# Patient Record
Sex: MALE | Race: BLACK OR AFRICAN AMERICAN | NOT HISPANIC OR LATINO | URBAN - METROPOLITAN AREA
[De-identification: names, ages, dates, MRNs, and addresses within clinical notes are randomized per-mention and may not be internally consistent; named-entity substitution may affect disease eponyms.]

---

## 2018-06-12 ENCOUNTER — EMERGENCY (EMERGENCY)
Facility: HOSPITAL | Age: 53
LOS: 1 days | Discharge: HOME | End: 2018-06-12
Attending: PERSONAL EMERGENCY RESPONSE ATTENDANT
Payer: COMMERCIAL

## 2018-06-12 VITALS
SYSTOLIC BLOOD PRESSURE: 99 MMHG | TEMPERATURE: 98 F | OXYGEN SATURATION: 99 % | WEIGHT: 149.91 LBS | HEART RATE: 61 BPM | DIASTOLIC BLOOD PRESSURE: 67 MMHG | RESPIRATION RATE: 19 BRPM | HEIGHT: 67 IN

## 2018-06-12 LAB
ALBUMIN SERPL ELPH-MCNC: 4.3 G/DL — SIGNIFICANT CHANGE UP (ref 3.3–5)
ALP SERPL-CCNC: 55 U/L — SIGNIFICANT CHANGE UP (ref 40–120)
ALT FLD-CCNC: 9 U/L — LOW (ref 10–45)
ANION GAP SERPL CALC-SCNC: 13 MMOL/L — SIGNIFICANT CHANGE UP (ref 5–17)
AST SERPL-CCNC: 20 U/L — SIGNIFICANT CHANGE UP (ref 10–40)
BASOPHILS # BLD AUTO: 0 K/UL — SIGNIFICANT CHANGE UP (ref 0–0.2)
BASOPHILS NFR BLD AUTO: 1.1 % — SIGNIFICANT CHANGE UP (ref 0–2)
BILIRUB SERPL-MCNC: 0.4 MG/DL — SIGNIFICANT CHANGE UP (ref 0.2–1.2)
BUN SERPL-MCNC: 11 MG/DL — SIGNIFICANT CHANGE UP (ref 7–23)
CALCIUM SERPL-MCNC: 9.3 MG/DL — SIGNIFICANT CHANGE UP (ref 8.4–10.5)
CHLORIDE SERPL-SCNC: 95 MMOL/L — LOW (ref 96–108)
CK MB BLD-MCNC: 0.5 % — SIGNIFICANT CHANGE UP (ref 0–3.5)
CK MB CFR SERPL CALC: 1.1 NG/ML — SIGNIFICANT CHANGE UP (ref 0–6.7)
CK SERPL-CCNC: 244 U/L — HIGH (ref 30–200)
CO2 SERPL-SCNC: 27 MMOL/L — SIGNIFICANT CHANGE UP (ref 22–31)
CREAT SERPL-MCNC: 1.01 MG/DL — SIGNIFICANT CHANGE UP (ref 0.5–1.3)
EOSINOPHIL # BLD AUTO: 0.2 K/UL — SIGNIFICANT CHANGE UP (ref 0–0.5)
EOSINOPHIL NFR BLD AUTO: 4.3 % — SIGNIFICANT CHANGE UP (ref 0–6)
GLUCOSE SERPL-MCNC: 84 MG/DL — SIGNIFICANT CHANGE UP (ref 70–99)
HCT VFR BLD CALC: 30.1 % — LOW (ref 39–50)
HGB BLD-MCNC: 10.6 G/DL — LOW (ref 13–17)
LIDOCAIN IGE QN: 21 U/L — SIGNIFICANT CHANGE UP (ref 7–60)
LYMPHOCYTES # BLD AUTO: 0.9 K/UL — LOW (ref 1–3.3)
LYMPHOCYTES # BLD AUTO: 23.3 % — SIGNIFICANT CHANGE UP (ref 13–44)
MCHC RBC-ENTMCNC: 29.3 PG — SIGNIFICANT CHANGE UP (ref 27–34)
MCHC RBC-ENTMCNC: 35.2 GM/DL — SIGNIFICANT CHANGE UP (ref 32–36)
MCV RBC AUTO: 83.1 FL — SIGNIFICANT CHANGE UP (ref 80–100)
MONOCYTES # BLD AUTO: 0.5 K/UL — SIGNIFICANT CHANGE UP (ref 0–0.9)
MONOCYTES NFR BLD AUTO: 12.8 % — SIGNIFICANT CHANGE UP (ref 2–14)
NEUTROPHILS # BLD AUTO: 2.4 K/UL — SIGNIFICANT CHANGE UP (ref 1.8–7.4)
NEUTROPHILS NFR BLD AUTO: 58.5 % — SIGNIFICANT CHANGE UP (ref 43–77)
PLATELET # BLD AUTO: 292 K/UL — SIGNIFICANT CHANGE UP (ref 150–400)
POTASSIUM SERPL-MCNC: 4.2 MMOL/L — SIGNIFICANT CHANGE UP (ref 3.5–5.3)
POTASSIUM SERPL-SCNC: 4.2 MMOL/L — SIGNIFICANT CHANGE UP (ref 3.5–5.3)
PROT SERPL-MCNC: 8.4 G/DL — HIGH (ref 6–8.3)
RBC # BLD: 3.62 M/UL — LOW (ref 4.2–5.8)
RBC # FLD: 11.7 % — SIGNIFICANT CHANGE UP (ref 10.3–14.5)
SODIUM SERPL-SCNC: 135 MMOL/L — SIGNIFICANT CHANGE UP (ref 135–145)
TROPONIN T SERPL-MCNC: <0.01 NG/ML — SIGNIFICANT CHANGE UP (ref 0–0.06)
TROPONIN T SERPL-MCNC: <0.01 NG/ML — SIGNIFICANT CHANGE UP (ref 0–0.06)
WBC # BLD: 4 K/UL — SIGNIFICANT CHANGE UP (ref 3.8–10.5)
WBC # FLD AUTO: 4 K/UL — SIGNIFICANT CHANGE UP (ref 3.8–10.5)

## 2018-06-12 PROCEDURE — 71045 X-RAY EXAM CHEST 1 VIEW: CPT | Mod: 26

## 2018-06-12 PROCEDURE — 74177 CT ABD & PELVIS W/CONTRAST: CPT | Mod: 26

## 2018-06-12 PROCEDURE — 99220: CPT

## 2018-06-12 PROCEDURE — 71275 CT ANGIOGRAPHY CHEST: CPT | Mod: 26

## 2018-06-12 PROCEDURE — 93010 ELECTROCARDIOGRAM REPORT: CPT

## 2018-06-12 RX ORDER — ASPIRIN/CALCIUM CARB/MAGNESIUM 324 MG
81 TABLET ORAL DAILY
Qty: 0 | Refills: 0 | Status: DISCONTINUED | OUTPATIENT
Start: 2018-06-12 | End: 2018-06-16

## 2018-06-12 RX ORDER — ASPIRIN/CALCIUM CARB/MAGNESIUM 324 MG
324 TABLET ORAL ONCE
Qty: 0 | Refills: 0 | Status: COMPLETED | OUTPATIENT
Start: 2018-06-12 | End: 2018-06-12

## 2018-06-12 NOTE — ED CDU PROVIDER INITIAL DAY NOTE - DETAILS
Pt with worsening SOB on exertion, trop negative, CTA chest negative for PE, observe, repeat troponins, tele, stress test. Pt requires frequent reevaluation, d/w Dr. Schreiber.

## 2018-06-12 NOTE — ED CDU PROVIDER INITIAL DAY NOTE - PHYSICAL EXAMINATION
Pt in NAD, A&O x3. CN 2-12 grossly intact. Head NCAT, sclera white without injection, PERRLA, EOMI. Nares patent, turbinates without edema or erythema, no polyps or septal deviation. No auricular tenderness. Mouth and pharynx without erythema or exudates, uvula midline, no tonsillar enlargement. Trachea midline, no lymphadenopathy, no obvious JVD. No retractions or chest wall deformities. No chest wall tenderness, CTA anterior and posterior b/l, no wheezes, rales, or rhonchi. RRR, clear distinct S1, S2, no S3, S4, murmurs, gallops, or rubs. Abdomen without obvious deformities, lesions, or pulsations. NABS all 4 quadrants, abdomen soft, non-tender, no organomegaly or masses. No CVAT b/l. 2+ carotid, radial, ulnar, dorsalis pedis, and posterior tibial pulses. Cap refill <2 sec, no cyanosis or clubbing. No edema or tenderness of the lower extremities.

## 2018-06-12 NOTE — ED PROVIDER NOTE - PROGRESS NOTE DETAILS
Amilcar PALOMINO - 52yoM hx of sarcoid pw worsening, exertional shortness of breath no chest pain. no fevers, chills, nausea, vomiting, diarreha. + wieght loss. concern for PE vs acs vs malignancy. will send labs, trop, ekg, cxr, ct angio and admit vs cdu for stress. Attending MD [employer]:  Blood cultures added to labs 2/2 friend report of pt's recent dental procedure preceding his precipitous decline.  Concern for poss endocarditis.  No murmurs auscultated. Amilcar PALOMINO - will send to CDU for stress.

## 2018-06-12 NOTE — ED PROVIDER NOTE - OBJECTIVE STATEMENT
Attending MD Schreiber.  Pt is a 52 yr old male with isolated PMHx of sarcoidosis.  Had echo ~1 yr ago and endroses stress test poss 3 yrs ago.  No overt cardiac hx.  Pt has been having increased SOB with ambulation ra up stairs over the last 2 mos which is his reason for presentation now.  No known hx of PE/blood clots.  No AC on board.  No LE edema/pain.  Pt's partner () is with him and has privately endorsed a sig concern because of pt's sig change in body habitus, eating habits and recently his mental status.  States that he's begun not eating much and becomes so SOB after walking up a flight of stairs that he doubles over SOB and vomited once this morning.  No LE edema.  Pt does not believe he has a cardiologist, cannot remember name of PCP.  Of note pt has a soft BP but states that this is chronic for him.  He is not hypoxic. No acute resp distress.    Pulm: Dr. Lares Attending MD Schreiber.  Pt is a 52 yr old male with isolated PMHx of sarcoidosis.  Had echo ~1 yr ago and endroses stress test poss 3 yrs ago.  No overt cardiac hx.  Pt has been having increased SOB with ambulation ra up stairs over the last 2 mos which is his reason for presentation now.  No known hx of PE/blood clots.  No AC on board.  No LE edema/pain.  Pt's partner () is with him and has privately endorsed a sig concern because of pt's sig change in body habitus, eating habits and recently his mental status.  States that he's begun not eating much and becomes so SOB after walking up a flight of stairs that he doubles over SOB and vomited once this morning.  No LE edema.  Pt does not believe he has a cardiologist, cannot remember name of PCP.  Of note pt has a soft BP but states that this is chronic for him.  He is not hypoxic. No acute resp distress.   Lifetime non-smoker.  Pulm: Dr. Lares

## 2018-06-12 NOTE — ED CDU PROVIDER INITIAL DAY NOTE - OBJECTIVE STATEMENT
53 y/o male with h/o u/l headache/facial pain managed with Allegra seen by neuro, and sarcoidosis (Pulm Dr. Lares) presents with gradually worsening SOB x 3 months, pt states he was able to take the stairs at work yesterday but was winded today, +episodic dizziness x 3months with no clear palliative/provoking factors, denies chest pain, back pain, and cough. Pt has felt well recently other than some of his usual seasonal allergy symptoms. Pt does not feel SOB now or at rest. Prior stress test "a few years ago" for unknown reason, not currently seen by cardiology. Pt denies fevers, chills, numbness, paresthesias, weakness, falls/trauma, syncope, new HA, abdominal pain, n/v/d, recent leg pain or swelling, h/o DVT, PE, stroke, TIA, MI, sick contacts, recent illness, or prior episodes. 51 y/o male with h/o u/l headache/facial pain managed with Allegra seen by neuro, and sarcoidosis (Pulm Dr. Lares) presents with gradually worsening SOB x 3 months, pt states he was able to take the stairs with minimal difficulty yesterday but was winded doing so today, +episodic dizziness x 3months with no clear palliative/provoking factors, denies chest pain, back pain, and cough. Pt has felt well recently other than some of his usual seasonal allergy symptoms. Pt does not feel SOB now or at rest. Prior stress test "a few years ago" for unknown reason, not currently seen by cardiology. Pt denies fevers, chills, numbness, paresthesias, weakness, falls/trauma, syncope, new HA, abdominal pain, n/v/d, recent leg pain or swelling, h/o DVT, PE, stroke, TIA, MI, sick contacts, recent illness, or prior episodes. 53 y/o male with h/o u/l headache/facial pain managed with Allegra seen by neuro, and sarcoidosis (Pulm Dr. Lares) presents with gradually worsening SOB x 3 months, pt states he was able to take the stairs with minimal difficulty yesterday but was winded doing so today, +episodic dizziness x 3months with no clear palliative/provoking factors, denies chest pain, back pain, and cough. Pt has felt well recently other than some of his usual seasonal allergy symptoms. Pt does not feel SOB now or at rest. Prior stress test "a few years ago" for unknown reason, not currently seen by cardiology. Pt denies fevers, chills, numbness, paresthesias, weakness, falls/trauma, syncope, new HA, abdominal pain, n/v/d, recent leg pain or swelling, h/o DVT, PE, stroke, TIA, MI, sick contacts, recent illness, or prior episodes.    ED course: VS wnl, pt in NAD non-toxic, no hypoxia, CTA negative for PE, troponin x1 negative.

## 2018-06-12 NOTE — ED ADULT NURSE NOTE - OBJECTIVE STATEMENT
53y/o Male presented to the ED from home with complaint of SOB. A&Ox3, ambulatory. Patient reports SOB with exertion x1 month. Reports dizziness x4 months. Reports symptoms are worse when "walking up and down the stairs. Denies LOC/syncope. Reports abdominal cramping/ constipation for a few months. Reports x1 episode of vomiting with diarrhea this morning. Reports decreased apatite since 2015. Patients friends reports "that patient has been himself the past year with significant weight loss."  Denies visual changes, double vision, headache, chest pain. No respiratory distress noted, SPO2 100% on room air, RR 18. No use of accessory muscles noted, skin color appropriate for race. Lung sounds equal/clear bilaterally, cap refill < 2 sec. Patient states he had his "wisdom tooth removed in 2014 and has residual right sided facial numbness." 53y/o Male presented to the ED from home with complaint of SOB. A&Ox3, ambulatory. Patient reports SOB with exertion x1 month. Reports dizziness x4 months. Reports symptoms are worse when "walking up and down the stairs. Denies SOB at this time. Denies LOC/syncope. Reports abdominal cramping/ constipation for a few months. Reports x1 episode of vomiting with diarrhea this morning. Reports decreased apatite since 2015. Patients friends reports "that patient has been himself the past year with significant weight loss."  Denies visual changes, double vision, headache, chest pain. No respiratory distress noted, SPO2 100% on room air, RR 18. No use of accessory muscles noted, skin color appropriate for race. Lung sounds equal/clear bilaterally, cap refill < 2 sec. Patient states he had his "wisdom tooth removed in 2014 and has residual right sided facial numbness."

## 2018-06-12 NOTE — ED PROVIDER NOTE - MEDICAL DECISION MAKING DETAILS
52yoM hx of sarcoid pw sob, worsening. concern for pe vs acs vs other. will ct angio, trop, ekg, cxr and admit vs cdu for stress.

## 2018-06-12 NOTE — ED CDU PROVIDER INITIAL DAY NOTE - MEDICAL DECISION MAKING DETAILS
Attending MD Schreiber.  Pt is a 52 yr old male with isolated PMHx of sarcoidosis.  Had echo ~1 yr ago and endroses stress test poss 3 yrs ago.  No overt cardiac hx.  Pt has been having increased SOB with ambulation ra up stairs over the last 2 mos which is his reason for presentation now.  No known hx of PE/blood clots.  No AC on board.  No LE edema/pain.  Pt's partner () is with him and has privately endorsed a sig concern because of pt's sig change in body habitus, eating habits and recently his mental status.  States that he's begun not eating much and becomes so SOB after walking up a flight of stairs that he doubles over SOB and vomited once this morning.  No LE edema.  Pt does not believe he has a cardiologist, cannot remember name of PCP.  Of note pt has a soft BP but states that this is chronic for him.  He is not hypoxic. No acute resp distress.   Lifetime non-smoker.  CTA non-actionable for PE.  Pt warrants transfer of care to CDU observation unit for stress/echo.

## 2018-06-12 NOTE — ED CDU PROVIDER INITIAL DAY NOTE - FAMILY HISTORY
Mother  Still living? Unknown  Family history of hypertension, Age at diagnosis: Age Unknown  Family history of diabetes mellitus in mother, Age at diagnosis: Age Unknown  Family history of cardiac pacemaker, Age at diagnosis: Age Unknown

## 2018-06-12 NOTE — ED CDU PROVIDER DISPOSITION NOTE - ATTENDING CONTRIBUTION TO CARE
SOB/MCKEON in pt with sarcoidosis.  r/o anginal equivalent and CAD --> CTCA as requested by cardiology.  cardiac monitor, reassess.

## 2018-06-12 NOTE — ED ADULT TRIAGE NOTE - CHIEF COMPLAINT QUOTE
SOB, dizziness for 3 months, constipation, anemia, Pt states that he was seen out patient for dizziness with negative CT scan

## 2018-06-12 NOTE — ED CDU PROVIDER DISPOSITION NOTE - PLAN OF CARE
1. Follow up with your PMD in the next 1-2 days. Additionally please follow up with out cardiology clinic ([phone]) within the next 2-3 days for further evaluation/management. Please follow up with your pulmonologist regarding the lung nodule found on your CT scan.  2. Show copies of your reports given to you. Recommend Aspirin 81mg over the counter daily until further evaluation.  Take all of your other medications as previously prescribed.   3. If you develop any worsening or continued chest pain, shortness of breath, weakness, nausea/vomiting, or for all other concerning symptoms return to ED immediately.

## 2018-06-12 NOTE — ED CDU PROVIDER INITIAL DAY NOTE - PROGRESS NOTE DETAILS
Patient resting in bed comfortably in NAD. No complaints. VSS. No events on telemetry monitor. Patient seen at bedside in NAD.  VSS.  Patient resting comfortably without complaints. No events on tele. Patient without current complaints including cp, sob, weakness, n/v, dizziness, or other concerns at this time. Will continue to monitor. - Ranjit Saha PA-C

## 2018-06-12 NOTE — ED CDU PROVIDER DISPOSITION NOTE - CLINICAL COURSE
53 y/o male with h/o u/l headache/facial pain managed with Allegra seen by neuro, and sarcoidosis (Pulm Dr. Lares) presents with gradually worsening SOB x 3 months, pt states he was able to take the stairs with minimal difficulty yesterday but was winded doing so today, +episodic dizziness x 3months with no clear palliative/provoking factors, denies chest pain, back pain, and cough. Pt has felt well recently other than some of his usual seasonal allergy symptoms. Pt does not feel SOB now or at rest. Prior stress test "a few years ago" for unknown reason, not currently seen by cardiology. Pt denies fevers, chills, numbness, paresthesias, weakness, falls/trauma, syncope, new HA, abdominal pain, n/v/d, recent leg pain or swelling, h/o DVT, PE, stroke, TIA, MI, sick contacts, recent illness, or prior episodes.    ED course: VS wnl, pt in NAD non-toxic, no hypoxia, CXR wnl, CTA negative for PE, CK: 244, troponin x1 negative.  CDU: Serial reeval, 2nd troponin __, tele, stress in the am. Pt remains stable, in NAD, VS wnl, no acute or concerning physical exam findings. 51 y/o male with h/o u/l headache/facial pain managed with Allegra seen by neuro, and sarcoidosis (Pulm Dr. Lares) presents with gradually worsening SOB x 3 months, pt states he was able to take the stairs with minimal difficulty yesterday but was winded doing so today, +episodic dizziness x 3months with no clear palliative/provoking factors, denies chest pain, back pain, and cough. Pt has felt well recently other than some of his usual seasonal allergy symptoms. Pt does not feel SOB now or at rest. Prior stress test "a few years ago" for unknown reason, not currently seen by cardiology. Pt denies fevers, chills, numbness, paresthesias, weakness, falls/trauma, syncope, new HA, abdominal pain, n/v/d, recent leg pain or swelling, h/o DVT, PE, stroke, TIA, MI, sick contacts, recent illness, or prior episodes.    ED course: VS wnl, pt in NAD non-toxic, no hypoxia, CXR wnl, CTA negative for PE, CK: 244, troponin x1 negative.  CDU: Serial reeval, 2nd troponin __, tele, stress in the am. Pt remains stable, in NAD, VS wnl, no acute or concerning physical exam findings.  PM: _______________ 51 y/o male with h/o u/l headache/facial pain managed with Allegra seen by neuro, and sarcoidosis (Pulm Dr. Lares) presents with gradually worsening SOB x 3 months, pt states he was able to take the stairs with minimal difficulty yesterday but was winded doing so today, +episodic dizziness x 3months with no clear palliative/provoking factors, denies chest pain, back pain, and cough. Pt has felt well recently other than some of his usual seasonal allergy symptoms. Pt does not feel SOB now or at rest. Prior stress test "a few years ago" for unknown reason, not currently seen by cardiology. Pt denies fevers, chills, numbness, paresthesias, weakness, falls/trauma, syncope, new HA, abdominal pain, n/v/d, recent leg pain or swelling, h/o DVT, PE, stroke, TIA, MI, sick contacts, recent illness, or prior episodes.    ED course: VS wnl, pt in NAD non-toxic, no hypoxia, CXR wnl, CTA negative for PE, CK: 244, troponin x1 negative.  CDU: Serial reeval, 2nd troponin negative. No events on tele stress in the am. Pt remained stable, in NAD, VS wnl, no acute or concerning physical exam findings. Pt had no adverse events overnight while in CDU with no concerning symptoms. Stress in AM resulted as _____________________. 51 y/o male with h/o u/l headache/facial pain managed with Allegra seen by neuro, and sarcoidosis (Pulm Dr. Lares) presents with gradually worsening SOB x 3 months, pt states he was able to take the stairs with minimal difficulty yesterday but was winded doing so today, +episodic dizziness x 3months with no clear palliative/provoking factors, denies chest pain, back pain, and cough. Pt has felt well recently other than some of his usual seasonal allergy symptoms. Pt does not feel SOB now or at rest. Prior stress test "a few years ago" for unknown reason, not currently seen by cardiology. Pt denies fevers, chills, numbness, paresthesias, weakness, falls/trauma, syncope, new HA, abdominal pain, n/v/d, recent leg pain or swelling, h/o DVT, PE, stroke, TIA, MI, sick contacts, recent illness, or prior episodes.    ED course: VS wnl, pt in NAD non-toxic, no hypoxia, CXR wnl, CTA negative for PE, CK: 244, troponin x1 negative.  CDU: Serial reeval, 2nd troponin negative. No events on tele stress in the am. Pt remained stable, in NAD, VS wnl, no acute or concerning physical exam findings. Pt had no adverse events overnight while in CDU with no concerning symptoms. CTC performed in AM which was negative. Patient safe to be discharged home per ED attending Dr. Bernardo

## 2018-06-13 VITALS
RESPIRATION RATE: 18 BRPM | DIASTOLIC BLOOD PRESSURE: 69 MMHG | HEART RATE: 62 BPM | TEMPERATURE: 99 F | SYSTOLIC BLOOD PRESSURE: 103 MMHG | OXYGEN SATURATION: 100 %

## 2018-06-13 LAB
CHOLEST SERPL-MCNC: 217 MG/DL — HIGH (ref 10–199)
HBA1C BLD-MCNC: 5.2 % — SIGNIFICANT CHANGE UP (ref 4–5.6)
HDLC SERPL-MCNC: 40 MG/DL — SIGNIFICANT CHANGE UP (ref 40–125)
LIPID PNL WITH DIRECT LDL SERPL: 161 MG/DL — HIGH
TOTAL CHOLESTEROL/HDL RATIO MEASUREMENT: 5.4 RATIO — SIGNIFICANT CHANGE UP (ref 3.4–9.6)
TRIGL SERPL-MCNC: 81 MG/DL — SIGNIFICANT CHANGE UP (ref 10–149)

## 2018-06-13 PROCEDURE — 84484 ASSAY OF TROPONIN QUANT: CPT

## 2018-06-13 PROCEDURE — 75571 CT HRT W/O DYE W/CA TEST: CPT

## 2018-06-13 PROCEDURE — 93005 ELECTROCARDIOGRAM TRACING: CPT | Mod: 76

## 2018-06-13 PROCEDURE — 83036 HEMOGLOBIN GLYCOSYLATED A1C: CPT

## 2018-06-13 PROCEDURE — 83690 ASSAY OF LIPASE: CPT

## 2018-06-13 PROCEDURE — 82553 CREATINE MB FRACTION: CPT

## 2018-06-13 PROCEDURE — 75574 CT ANGIO HRT W/3D IMAGE: CPT

## 2018-06-13 PROCEDURE — 96376 TX/PRO/DX INJ SAME DRUG ADON: CPT

## 2018-06-13 PROCEDURE — 96374 THER/PROPH/DIAG INJ IV PUSH: CPT

## 2018-06-13 PROCEDURE — 80053 COMPREHEN METABOLIC PANEL: CPT

## 2018-06-13 PROCEDURE — 80061 LIPID PANEL: CPT

## 2018-06-13 PROCEDURE — 71045 X-RAY EXAM CHEST 1 VIEW: CPT

## 2018-06-13 PROCEDURE — 75574 CT ANGIO HRT W/3D IMAGE: CPT | Mod: 26

## 2018-06-13 PROCEDURE — 87040 BLOOD CULTURE FOR BACTERIA: CPT

## 2018-06-13 PROCEDURE — 74177 CT ABD & PELVIS W/CONTRAST: CPT

## 2018-06-13 PROCEDURE — 99217: CPT

## 2018-06-13 PROCEDURE — 85027 COMPLETE CBC AUTOMATED: CPT

## 2018-06-13 PROCEDURE — 82550 ASSAY OF CK (CPK): CPT

## 2018-06-13 PROCEDURE — 99284 EMERGENCY DEPT VISIT MOD MDM: CPT | Mod: 25

## 2018-06-13 PROCEDURE — G0378: CPT

## 2018-06-13 PROCEDURE — 71275 CT ANGIOGRAPHY CHEST: CPT

## 2018-06-13 RX ORDER — SODIUM CHLORIDE 9 MG/ML
1000 INJECTION INTRAMUSCULAR; INTRAVENOUS; SUBCUTANEOUS ONCE
Qty: 0 | Refills: 0 | Status: COMPLETED | OUTPATIENT
Start: 2018-06-13 | End: 2018-06-13

## 2018-06-13 RX ORDER — ONDANSETRON 8 MG/1
4 TABLET, FILM COATED ORAL ONCE
Qty: 0 | Refills: 0 | Status: COMPLETED | OUTPATIENT
Start: 2018-06-13 | End: 2018-06-13

## 2018-06-13 RX ORDER — ALPRAZOLAM 0.25 MG
0.25 TABLET ORAL ONCE
Qty: 0 | Refills: 0 | Status: DISCONTINUED | OUTPATIENT
Start: 2018-06-13 | End: 2018-06-13

## 2018-06-13 RX ORDER — METOPROLOL TARTRATE 50 MG
100 TABLET ORAL ONCE
Qty: 0 | Refills: 0 | Status: COMPLETED | OUTPATIENT
Start: 2018-06-13 | End: 2018-06-13

## 2018-06-13 RX ORDER — SODIUM CHLORIDE 9 MG/ML
1000 INJECTION INTRAMUSCULAR; INTRAVENOUS; SUBCUTANEOUS
Qty: 0 | Refills: 0 | Status: DISCONTINUED | OUTPATIENT
Start: 2018-06-13 | End: 2018-06-16

## 2018-06-13 RX ADMIN — SODIUM CHLORIDE 150 MILLILITER(S): 9 INJECTION INTRAMUSCULAR; INTRAVENOUS; SUBCUTANEOUS at 09:32

## 2018-06-13 RX ADMIN — Medication 0.25 MILLIGRAM(S): at 08:36

## 2018-06-13 RX ADMIN — ONDANSETRON 4 MILLIGRAM(S): 8 TABLET, FILM COATED ORAL at 08:36

## 2018-06-13 RX ADMIN — ONDANSETRON 4 MILLIGRAM(S): 8 TABLET, FILM COATED ORAL at 12:57

## 2018-06-13 RX ADMIN — SODIUM CHLORIDE 1000 MILLILITER(S): 9 INJECTION INTRAMUSCULAR; INTRAVENOUS; SUBCUTANEOUS at 08:40

## 2018-06-13 RX ADMIN — Medication 100 MILLIGRAM(S): at 08:35

## 2018-06-13 RX ADMIN — SODIUM CHLORIDE 500 MILLILITER(S): 9 INJECTION INTRAMUSCULAR; INTRAVENOUS; SUBCUTANEOUS at 08:36

## 2018-06-13 NOTE — ED ADULT NURSE REASSESSMENT NOTE - NS ED NURSE REASSESS COMMENT FT1
1600   Pt was evaluated by  DR Tova OBANDO MD with all the results  . Pt is discharged .  Pt  feeling better Vital signs stable.  SHELDON Hernandez Explained the follow up  & discharge summary  Pt verbalized the understanding on the same Pt stable to go home.
Patient A&Ox3. Patient resting comfortably in bed, no complaints at this time. Patient awaiting CT abdomen. Friend at bedside. Patient on cardiac monitor, NSR. Will continue to monitor.
Pt report received from Neeta AWRD. Pt transferred to cdu BED 2 for cardiac monitoring and NucStress. Pt a/ox3 denies respiratory distress, sob, dyspnea, C/P, palpitations, n/v/d at this time.  VSS, afebrile, IV clean/dry/intact. Pt aware of plan of care, call bell within reach ,safety maintained. Will monitor and assess.
Pt received from SYLVIA Olsen. Pt oriented to CDU & plan of care was discussed. Pt denies any chest pain, SOB, dizziness or palpitations at this time. Pt endorses severe SOB when ambulating up the stairs associated with lightheadedness and weakness. Pt aware to notify RN or PA of any dizziness prior to ambulation. Safety & comfort measures maintained. Call bell in reach. Will continue to monitor.
07.00 Am  Received Report from Kashmir Lamar   08.30 AM Pt reassessed . Pt resting. Pt states he feels better ,  Kathryn SOB now  ,Vital signs has soft blood pressure 90/60 Medicated as per order  , IV med lock looks clean & dry No signs of infiltration noted.  Comfort care & safety measures initiated.  Kathryn fever chills CP SOB afebrile here. Continue to Monitor .Pt is awaiting for  CTC

## 2018-06-13 NOTE — ED CDU PROVIDER SUBSEQUENT DAY NOTE - PROGRESS NOTE DETAILS
Patient sleeping. NAD. No complaints. VSS. No events on tele. Will continue to monitor. - Ranjit Saha PA-C Patient seen at bedside in NAD.  VSS.  Patient resting comfortably without complaints. No events on tele. No current shortness of breath at rest, chest pain, dizziness, weakness, n/v, cough, fever/chills. Patient appears well. Will continue to monitor. - Ranjit Saha PA-C SHELDON Hernandez: Patient blood pressure 90/60s however patient asymptomatic and pt stated his BP runs low. SYLVIA Jacobs called on behalf of Dr. Diego De La Torre requesting a CTC performed on this patient given low risk factors. Arlene WARD stated patient who get CTC usually received 60mL of contrast. Patient received 90ml contrast yesterday for CT abdomen/pelvis/chest and has good renal function. Discussed case with Red attending Dr. Argueta who stated ok to do CTC and to discuss risks with patient as patient just received contrast and hydrate patient while in CDU. Discussed risk factors with patient and made aware will hydrate pt while in CDU to prevent MICHAEL. Patient understood and ok to do CTC. Patient stated he feels nauseous with contrast and gets "warm sensation" with contrast therefore will give xanax 0.25mg to prevent elevation in HR while receiving contrast, will bolus patient with NS and place on maintenance fluids, zofran prior to CTC, and lopressor 100mg as patient HR in 70s. No events noted while on tele. Made CTC tech aware of new order. Patient understood plan and remain asymptomatic with no SOB, CP, abdominal pain, N/V, or dizziness Pt without complaint.  CT of chest/abdo/pelvis neg for dissection and c/w sarcoidosis.  Trop eng  Pt pending CTCornoaryAngio to eval for CAD.  Pt stable for D/c if neg. SHELDON Hernandez: CTC called stating patient ate prior to CTC and needs to NPO 4 hours prior to CTC. Made nuc stress lab aware will have to do pharm nuc as patient was beta blocked with lopressor this morning in anticipation for CTC. Made ED attending Dr. Bernardo aware of plan. Patient stated he did not have caffeine this morning and only had oatmeal with milk SHELDON Hernandez: CTC Dr. Armstrong called to discuss pt and stated will do CTC this afternoon. Will instruct patient to not eat and will give zofran prior to CTC. HR in mid 50s SHELDON Hernandez: CTC negative therefore per ED attending Dr. Bernardo patient safe to be discharged home

## 2018-06-13 NOTE — ED CDU PROVIDER SUBSEQUENT DAY NOTE - HISTORY
Patient received phone call so was woken up and evaluated at that time. He was seen at bedside in NAD with stable vital signs and without complaints. Patient appeared well. Denied current cp, sob, weakness, n/v, numbness/tingling. He's been sleeping most of the night without complaints or events or telemetry. Will continue to monitor. - Ranjit Saha PA-C Patient woke up so evaluated at this time. He was seen at bedside in NAD with stable vital signs and without complaints. Patient appeared well. Denied current cp, sob, weakness, n/v, numbness/tingling. He's been sleeping most of the night without complaints or events or telemetry. Will continue to monitor. - Ranjit Saha PA-C

## 2018-06-13 NOTE — ED ADULT NURSE REASSESSMENT NOTE - COMFORT CARE
plan of care explained/po fluids offered/meal provided
plan of care explained/ambulated to bathroom/po fluids offered

## 2018-06-17 LAB
CULTURE RESULTS: SIGNIFICANT CHANGE UP
CULTURE RESULTS: SIGNIFICANT CHANGE UP
SPECIMEN SOURCE: SIGNIFICANT CHANGE UP
SPECIMEN SOURCE: SIGNIFICANT CHANGE UP

## 2018-06-25 PROBLEM — Z00.00 ENCOUNTER FOR PREVENTIVE HEALTH EXAMINATION: Status: ACTIVE | Noted: 2018-06-25

## 2018-07-18 ENCOUNTER — APPOINTMENT (OUTPATIENT)
Dept: ENDOCRINOLOGY | Facility: CLINIC | Age: 53
End: 2018-07-18
Payer: COMMERCIAL

## 2018-07-18 VITALS
HEART RATE: 91 BPM | HEIGHT: 68 IN | SYSTOLIC BLOOD PRESSURE: 84 MMHG | BODY MASS INDEX: 21.67 KG/M2 | WEIGHT: 143 LBS | DIASTOLIC BLOOD PRESSURE: 64 MMHG

## 2018-07-18 DIAGNOSIS — Z83.3 FAMILY HISTORY OF DIABETES MELLITUS: ICD-10-CM

## 2018-07-18 PROCEDURE — 99204 OFFICE O/P NEW MOD 45 MIN: CPT

## 2018-07-19 LAB
CORTIS PRE/P CHAL SERPL-SCNC: NORMAL
CORTIS SERPL-MCNC: 3.7 UG/DL
CORTIS SP2 SERPL-MCNC: 12.2
PROLACTIN SERPL-MCNC: 10.8 NG/ML
T4 FREE SERPL-MCNC: 0.6 NG/DL
TSH SERPL-ACNC: 1.38 UIU/ML

## 2018-07-24 LAB — ACTH-ESO: 14 PG/ML

## 2018-08-03 PROBLEM — D86.9 SARCOIDOSIS, UNSPECIFIED: Chronic | Status: ACTIVE | Noted: 2018-06-12

## 2018-08-10 ENCOUNTER — FORM ENCOUNTER (OUTPATIENT)
Age: 53
End: 2018-08-10

## 2018-08-11 ENCOUNTER — APPOINTMENT (OUTPATIENT)
Dept: MRI IMAGING | Facility: HOSPITAL | Age: 53
End: 2018-08-11
Payer: COMMERCIAL

## 2018-08-11 ENCOUNTER — OUTPATIENT (OUTPATIENT)
Dept: OUTPATIENT SERVICES | Facility: HOSPITAL | Age: 53
LOS: 1 days | End: 2018-08-11
Payer: COMMERCIAL

## 2018-08-11 PROCEDURE — 70553 MRI BRAIN STEM W/O & W/DYE: CPT | Mod: 26

## 2018-08-11 PROCEDURE — 70553 MRI BRAIN STEM W/O & W/DYE: CPT

## 2018-08-11 PROCEDURE — A9585: CPT

## 2018-08-16 ENCOUNTER — APPOINTMENT (OUTPATIENT)
Dept: ENDOCRINOLOGY | Facility: CLINIC | Age: 53
End: 2018-08-16
Payer: COMMERCIAL

## 2018-08-16 VITALS
BODY MASS INDEX: 23.34 KG/M2 | DIASTOLIC BLOOD PRESSURE: 71 MMHG | HEART RATE: 75 BPM | WEIGHT: 154 LBS | HEIGHT: 68 IN | SYSTOLIC BLOOD PRESSURE: 112 MMHG

## 2018-08-16 DIAGNOSIS — N52.9 MALE ERECTILE DYSFUNCTION, UNSPECIFIED: ICD-10-CM

## 2018-08-16 PROCEDURE — 99214 OFFICE O/P EST MOD 30 MIN: CPT

## 2018-08-22 ENCOUNTER — APPOINTMENT (OUTPATIENT)
Dept: NEUROLOGY | Facility: CLINIC | Age: 53
End: 2018-08-22
Payer: COMMERCIAL

## 2018-08-22 VITALS
DIASTOLIC BLOOD PRESSURE: 73 MMHG | HEART RATE: 75 BPM | BODY MASS INDEX: 23.64 KG/M2 | HEIGHT: 68 IN | WEIGHT: 156 LBS | SYSTOLIC BLOOD PRESSURE: 103 MMHG

## 2018-08-22 PROCEDURE — 99205 OFFICE O/P NEW HI 60 MIN: CPT

## 2018-08-27 ENCOUNTER — CLINICAL ADVICE (OUTPATIENT)
Age: 53
End: 2018-08-27

## 2018-09-10 ENCOUNTER — TRANSCRIPTION ENCOUNTER (OUTPATIENT)
Age: 53
End: 2018-09-10

## 2018-09-12 ENCOUNTER — CHART COPY (OUTPATIENT)
Age: 53
End: 2018-09-12

## 2018-09-26 ENCOUNTER — LABORATORY RESULT (OUTPATIENT)
Age: 53
End: 2018-09-26

## 2018-09-26 ENCOUNTER — APPOINTMENT (OUTPATIENT)
Dept: NEUROLOGY | Facility: CLINIC | Age: 53
End: 2018-09-26
Payer: COMMERCIAL

## 2018-09-26 PROCEDURE — 62270 DX LMBR SPI PNXR: CPT

## 2018-09-27 LAB
ALBUMIN CSF ELPH-MCNC: 68.7 MG/DL
ALBUMIN MFR SERPL ELPH: 54.8 %
ALBUMIN SERPL-MCNC: 3958 MG/DL
ALBUMIN SERPL-MCNC: 4 G/DL
ALBUMIN/GLOB CSF: 0.44 RATIO
ALBUMIN/GLOB SERPL: 1.2 RATIO
ALPHA1 GLOB MFR SERPL ELPH: 3.4 %
ALPHA1 GLOB SERPL ELPH-MCNC: 0.2 G/DL
ALPHA2 GLOB MFR SERPL ELPH: 9.9 %
ALPHA2 GLOB SERPL ELPH-MCNC: 0.7 G/DL
APPEARANCE CSF: CLEAR
B-GLOBULIN MFR SERPL ELPH: 10.4 %
B-GLOBULIN SERPL ELPH-MCNC: 0.8 G/DL
COLOR CSF: NORMAL
COLOR SPUN CSF: COLORLESS
CRYPTOC AG CSF QL: NEGATIVE
CSFPCR RESULT: NOT DETECTED
GAMMA GLOB CSF ELPH-MCNC: 30.5 MG/DL
GAMMA GLOB FLD ELPH-MCNC: 1.6 G/DL
GAMMA GLOB MFR CSF ELPH: 78 MG/DAY
GAMMA GLOB MFR SERPL ELPH: 21.5 %
IGG SER QL IEP: 1795 MG/DL
IGG/ALB CLEAR SER+CSF-RTO: 1
IGG/ALB SER: 0.45 RATIO
INTERPRETATION SERPL IEP-IMP: NORMAL
LYMPHOCYTES NFR CSF MANUAL: 79 %
MONOS+MACROS NFR CSF MANUAL: 21 %
NEUTROPHILS NFR CSF MANUAL: 0 %
PROT SERPL-MCNC: 7.3 G/DL
PROT SERPL-MCNC: 7.3 G/DL
RBC # CSF MANUAL: 0 /UL
TOTAL CELLS COUNTED CSF: 27 /UL
TUBE TYPE: NORMAL

## 2018-09-28 LAB
GLUCOSE CSF-MCNC: 36 MG/DL
PROT CSF-MCNC: 126 MG/DL

## 2018-10-01 LAB
BACTERIA CSF CULT: NORMAL
H CAPSUL AG CSF IA-MCNC: NORMAL
LACTATE CSF-MCNC: 1.9 MMOL/L
LDH FLD-CCNC: 33 U/L
Lab: 2.6 U/L
VDRL CSF-TITR: NEGATIVE
WNV IGG CSF IA-ACNC: POSITIVE
WNV IGM CSF IA-ACNC: NEGATIVE

## 2018-10-03 ENCOUNTER — RX RENEWAL (OUTPATIENT)
Age: 53
End: 2018-10-03

## 2018-10-03 ENCOUNTER — APPOINTMENT (OUTPATIENT)
Dept: NEUROLOGY | Facility: CLINIC | Age: 53
End: 2018-10-03
Payer: COMMERCIAL

## 2018-10-03 VITALS
DIASTOLIC BLOOD PRESSURE: 72 MMHG | HEIGHT: 68 IN | WEIGHT: 160 LBS | SYSTOLIC BLOOD PRESSURE: 109 MMHG | BODY MASS INDEX: 24.25 KG/M2

## 2018-10-03 LAB — ACE CSF-CCNC: <5 U/L

## 2018-10-03 PROCEDURE — 99214 OFFICE O/P EST MOD 30 MIN: CPT

## 2018-10-04 LAB — OLIGOCLONAL BANDS CSF ELPH-IMP: NORMAL

## 2018-10-08 LAB — CORE LAB FLUID CYTOLOGY: NORMAL

## 2018-10-19 ENCOUNTER — FORM ENCOUNTER (OUTPATIENT)
Age: 53
End: 2018-10-19

## 2018-10-20 ENCOUNTER — APPOINTMENT (OUTPATIENT)
Dept: CT IMAGING | Facility: HOSPITAL | Age: 53
End: 2018-10-20
Payer: COMMERCIAL

## 2018-10-20 ENCOUNTER — OUTPATIENT (OUTPATIENT)
Dept: OUTPATIENT SERVICES | Facility: HOSPITAL | Age: 53
LOS: 1 days | End: 2018-10-20
Payer: COMMERCIAL

## 2018-10-20 PROCEDURE — 74177 CT ABD & PELVIS W/CONTRAST: CPT

## 2018-10-20 PROCEDURE — 74177 CT ABD & PELVIS W/CONTRAST: CPT | Mod: 26

## 2018-10-26 ENCOUNTER — RX CHANGE (OUTPATIENT)
Age: 53
End: 2018-10-26

## 2018-10-29 LAB — FUNGUS CSF CULT: NORMAL

## 2018-11-15 ENCOUNTER — APPOINTMENT (OUTPATIENT)
Dept: ENDOCRINOLOGY | Facility: CLINIC | Age: 53
End: 2018-11-15
Payer: COMMERCIAL

## 2018-11-15 VITALS
HEART RATE: 94 BPM | HEIGHT: 68 IN | WEIGHT: 167 LBS | DIASTOLIC BLOOD PRESSURE: 64 MMHG | SYSTOLIC BLOOD PRESSURE: 99 MMHG | BODY MASS INDEX: 25.31 KG/M2

## 2018-11-15 DIAGNOSIS — Z78.9 OTHER SPECIFIED HEALTH STATUS: ICD-10-CM

## 2018-11-15 PROCEDURE — 99214 OFFICE O/P EST MOD 30 MIN: CPT

## 2018-11-15 RX ORDER — HYDROCORTISONE 5 MG/1
5 TABLET ORAL
Qty: 90 | Refills: 3 | Status: DISCONTINUED | COMMUNITY
Start: 2018-07-19 | End: 2018-11-15

## 2018-11-15 RX ORDER — HYDROCORTISONE 10 MG/1
10 TABLET ORAL
Qty: 90 | Refills: 3 | Status: DISCONTINUED | COMMUNITY
Start: 2018-07-19 | End: 2018-11-15

## 2018-11-16 LAB
ANION GAP SERPL CALC-SCNC: 11 MMOL/L
BUN SERPL-MCNC: 11 MG/DL
CALCIUM SERPL-MCNC: 9.5 MG/DL
CHLORIDE SERPL-SCNC: 102 MMOL/L
CO2 SERPL-SCNC: 29 MMOL/L
CREAT SERPL-MCNC: 0.93 MG/DL
GLUCOSE SERPL-MCNC: 80 MG/DL
POTASSIUM SERPL-SCNC: 4.6 MMOL/L
SODIUM SERPL-SCNC: 142 MMOL/L

## 2018-11-19 ENCOUNTER — RX RENEWAL (OUTPATIENT)
Age: 53
End: 2018-11-19

## 2018-11-19 LAB — ACID FAST STN CSF: NORMAL

## 2019-02-08 ENCOUNTER — APPOINTMENT (OUTPATIENT)
Dept: ENDOCRINOLOGY | Facility: CLINIC | Age: 54
End: 2019-02-08
Payer: COMMERCIAL

## 2019-02-08 VITALS
WEIGHT: 182 LBS | BODY MASS INDEX: 27.58 KG/M2 | HEART RATE: 80 BPM | SYSTOLIC BLOOD PRESSURE: 115 MMHG | HEIGHT: 68 IN | DIASTOLIC BLOOD PRESSURE: 67 MMHG

## 2019-02-08 PROCEDURE — 99214 OFFICE O/P EST MOD 30 MIN: CPT

## 2019-02-08 RX ORDER — FAMOTIDINE 20 MG/1
20 TABLET, FILM COATED ORAL DAILY
Qty: 30 | Refills: 1 | Status: DISCONTINUED | COMMUNITY
Start: 2018-10-04 | End: 2019-02-08

## 2019-02-08 RX ORDER — PREDNISONE 10 MG/1
10 TABLET ORAL
Qty: 154 | Refills: 0 | Status: DISCONTINUED | COMMUNITY
Start: 2018-10-04 | End: 2019-02-08

## 2019-02-08 RX ORDER — METHYLPREDNISOLONE SODIUM SUCCINATE 1 G/16ML
1000 INJECTION, POWDER, LYOPHILIZED, FOR SOLUTION INTRAMUSCULAR; INTRAVENOUS DAILY
Qty: 5000 | Refills: 0 | Status: DISCONTINUED | COMMUNITY
Start: 2018-10-04 | End: 2019-02-08

## 2019-02-13 LAB
ALBUMIN SERPL ELPH-MCNC: 4.2 G/DL
ALP BLD-CCNC: 57 U/L
ALT SERPL-CCNC: 20 U/L
ANION GAP SERPL CALC-SCNC: 13 MMOL/L
AST SERPL-CCNC: 23 U/L
BILIRUB SERPL-MCNC: 0.3 MG/DL
BUN SERPL-MCNC: 13 MG/DL
CALCIUM SERPL-MCNC: 9.6 MG/DL
CHLORIDE SERPL-SCNC: 101 MMOL/L
CO2 SERPL-SCNC: 27 MMOL/L
CREAT SERPL-MCNC: 0.95 MG/DL
GLUCOSE SERPL-MCNC: 75 MG/DL
HCT VFR BLD CALC: 37.3 %
HGB BLD-MCNC: 11.5 G/DL
POTASSIUM SERPL-SCNC: 4.4 MMOL/L
PROT SERPL-MCNC: 7.3 G/DL
PSA SERPL-MCNC: 0.29 NG/ML
SODIUM SERPL-SCNC: 141 MMOL/L
T4 FREE SERPL-MCNC: 1.2 NG/DL
TESTOST SERPL-MCNC: 6.4 NG/DL

## 2019-02-13 NOTE — PHYSICAL EXAM
[Alert] : alert [No Acute Distress] : no acute distress [Healthy Appearance] : healthy appearance [Normal Sclera/Conjunctiva] : normal sclera/conjunctiva [Visual Fields Grossly Intact] : visual fields grossly intact [Normal Oropharynx] : the oropharynx was normal [No Neck Mass] : no neck mass was observed [Supple] : the neck was supple [No LAD] : no lymphadenopathy [Thyroid Not Enlarged] : the thyroid was not enlarged [No Thyroid Nodules] : there were no palpable thyroid nodules [Normal Rate and Effort] : normal respiratory rhythm and effort [Clear to Auscultation] : lungs were clear to auscultation bilaterally [Normal Rate] : heart rate was normal  [Normal S1, S2] : normal S1 and S2 [Regular Rhythm] : with a regular rhythm [No Edema] : there was no peripheral edema [No Stigmata of Cushings Syndrome] : no stigmata of cushings syndrome [Normal Gait] : normal gait [Normal Insight/Judgement] : insight and judgment were intact [Kyphosis] : no kyphosis present [Acanthosis Nigricans] : no acanthosis nigricans [de-identified] : +/- palmar hyperpigmentation

## 2019-02-13 NOTE — ASSESSMENT
[FreeTextEntry1] : Adrenal insufficiency. Cosyntropin stimulation testing at his initial visit was significant for an inappropriate response with relatively low ACTH. He is taking prednisone 5 mg daily. We reviewed proper use and compliance with prednisone. \par Continue prednisone 5 mg daily\par Patient advised to take 3 times normal dose with illness or stress\par Patient advised to go to the emergency department immediately if he has nausea and cannot take prednisone\par Patient was advised to obtain a medical alert bracelet\par \par Central hypothyroidism. Free T4 was low, with an inappropriately normal TSH, consistent with central hypothyroidism. We reviewed proper use and compliance with levothyroxine.\par Continue levothyroxine 75 mcg daily\par Check free T4; TSH is not reliable in central hypothyroidism\par \par Secondary hypogonadism. He was noted to have undetectable testosterone and low FSH and LH.\par Continue testosterone 5 gm daily\par Check testosterone, blood counts, prostates specific antigen\par \par Diabetes insipidus. He was diagnosed with diabetes insipidus, unmasked by hydrocortisone.\par Continue desmopressin 0.1 mg daily\par Reviewed need to only drink to thirst with desmopressin\par Check basic metabolic panel today\par \par Return to clinic in 3 months. I advised him to have a few of his pills with him at all times so that he does not forget to take. Patient advised to call me immediately with any questions or concerns.\par \par Instructions given to patient:\par Low cortisol\par Take prednisone 5 mg (1 pill) every day\par If you get sick, take 3 pills a day and call me and your primary care doctor right away\par \par Low thyroid\par Take levothyroxine 75 mcg (1 pill) every day\par Take it on an empty stomach, with water only, and wait 30 minutes before eating or taking other pills\par \par For the high urination\par Take desmopressin 0.1 mg (1 tablet) every day\par \par Low testosterone\par Take packet of testosterone gel every day\par Have pharmacy call me every month for a refill of your prescription\par \par CC:\par Dr. Karthikeyan Spain, Fax [phone]

## 2019-02-13 NOTE — ADDENDUM
[FreeTextEntry1] : Recent test results as below; discussed with Mr. Rivera. Serum sodium within limits. Free T4 within range. Hemoglobin remains a little low. Testosterone very low. I encouraged compliance with medications. 2/13/19

## 2019-02-13 NOTE — HISTORY OF PRESENT ILLNESS
[FreeTextEntry1] : Mr. Rivera is a 53 year-old man with a history of sarcoidosis, normocytic anemia, erectile dysfunction presenting for follow-up. I saw him for an initial visit in July 2018 and last in November. 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 Reagan.\par \par Adrenal insufficiency.\par At his initial visit, he complained of an unintentional 70-pound weight loss starting in October 2014. He had loss of appetite, early satiety, nausea/vomiting with many foods, constipation. He had a an endoscopy/colonoscopy for evaluation of his symptoms per prior notes.\par At his initial visit he also complained of dizziness when rising from supine or seated positions, and had a fainting episode. He complained of occasional headaches, dyspnea on exertion, cold intolerance. \par We started hydrocortisone 10 mg/5 mg daily after cosyntropin stimulation testing at his initial visit, significant for an inappropriate response (less than 18 mcg/dL after cosyntropin) with relatively low ACTH. \par He was not able to take hydrocortisone twice daily and we switched to prednisone 5 mg daily.\par \par Central hypothyroidism.\par Free T4 was low, with an inappropriately normal TSH, consistent with central hypothyroidism.\par We started levothyroxine 75 mcg daily after starting hydrocortisone.\par He is taking levothyroxine in the morning, on an empty stomach, with plain water, and waiting at least 30 minutes before eating. He is not taking calcium/iron/multivitamin. \par Free T4 has been mid-range on this regimen.\par \par Hypogonadotrophic hypogonadism. \par He complains of symptoms of erectile dysfunction starting around 2016.\par He has not been recently sexually active; he has been monogamous with his wife.\par He complained of decreased morning erections and difficulty maintaining erections. He denies change in libido, shaving frequency, muscle weakness.\par He was noted to have undetectable testosterone and low FSH and LH in August 2018.\par He started testosterone 5 gm daily in October 2018 after issues with obtaining authorization through his insurance.\par \par Diabetes insipidus.\par He noted significantly increase urine output after starting hydrocortisone. The issue was brought to my attention in October 2018. \par We started desmopressin 0.05 (1/2 0.1 mg) pill once daily in October 2018, subsequently twice daily due to incomplete symptom relief. \par He takes 0.1 mg daily due to inability to take medications twice daily due to work.\par \par Interim History \par See telephone notes for interim history.\par He sometimes forgets to take his medications if he works overtime.\par He feels well today. Urine output is under good control on his regimen. Symptoms of erectile dysfunction are starting to improve on testosterone therapy.\par Medical and surgical history, medications, allergies, social and family history reviewed and updated as needed.

## 2019-02-13 NOTE — DATA REVIEWED
[FreeTextEntry1] : Laboratories (October 16, 2018) reviewed and significant for:\par Sodium 141 mmol/L

## 2019-03-10 ENCOUNTER — TRANSCRIPTION ENCOUNTER (OUTPATIENT)
Age: 54
End: 2019-03-10

## 2019-04-26 ENCOUNTER — RX RENEWAL (OUTPATIENT)
Age: 54
End: 2019-04-26

## 2019-05-13 ENCOUNTER — RX RENEWAL (OUTPATIENT)
Age: 54
End: 2019-05-13

## 2019-05-17 ENCOUNTER — APPOINTMENT (OUTPATIENT)
Dept: ENDOCRINOLOGY | Facility: CLINIC | Age: 54
End: 2019-05-17

## 2019-05-22 ENCOUNTER — RX RENEWAL (OUTPATIENT)
Age: 54
End: 2019-05-22

## 2019-06-03 PROBLEM — N52.9 MALE ERECTILE DISORDER: Status: ACTIVE | Noted: 2018-07-18

## 2019-08-13 ENCOUNTER — RX RENEWAL (OUTPATIENT)
Age: 54
End: 2019-08-13

## 2019-08-20 ENCOUNTER — TRANSCRIPTION ENCOUNTER (OUTPATIENT)
Age: 54
End: 2019-08-20

## 2019-10-08 ENCOUNTER — RX RENEWAL (OUTPATIENT)
Age: 54
End: 2019-10-08

## 2019-10-16 ENCOUNTER — APPOINTMENT (OUTPATIENT)
Dept: ENDOCRINOLOGY | Facility: CLINIC | Age: 54
End: 2019-10-16
Payer: COMMERCIAL

## 2019-10-16 VITALS
SYSTOLIC BLOOD PRESSURE: 108 MMHG | DIASTOLIC BLOOD PRESSURE: 69 MMHG | BODY MASS INDEX: 28.43 KG/M2 | WEIGHT: 187 LBS | HEART RATE: 80 BPM

## 2019-10-16 PROCEDURE — 99214 OFFICE O/P EST MOD 30 MIN: CPT

## 2019-10-23 ENCOUNTER — APPOINTMENT (OUTPATIENT)
Dept: NEUROLOGY | Facility: CLINIC | Age: 54
End: 2019-10-23
Payer: COMMERCIAL

## 2019-10-23 VITALS
HEART RATE: 75 BPM | SYSTOLIC BLOOD PRESSURE: 111 MMHG | HEIGHT: 68 IN | DIASTOLIC BLOOD PRESSURE: 72 MMHG | WEIGHT: 187 LBS | BODY MASS INDEX: 28.34 KG/M2

## 2019-10-23 PROCEDURE — 99214 OFFICE O/P EST MOD 30 MIN: CPT

## 2019-10-23 NOTE — HISTORY OF PRESENT ILLNESS
[FreeTextEntry1] : Initial hx 8/2018\par \par Retired in 9/2019\par 2014 - R sided facial numbness (no pain) after wisdom tooth extraction, involving the entire face even up to forehead and scalp. has been stable, persists to date. \par 2015 - Went to see neurologist who gave him pills for neuropathy. after got medication, began to have tingling and numbness in fingers, was told he has CTS. then switched medication which made him have mood alteration so he stopped that, then a 3rd medication began to make him dizzy so he stopped that one as well. \par 2015 - progressively worsening dizziness, SOB, generalized weakness, fatigue. went to [facility], admitted x8d, found anemia.\par 2016 - referred to pulm, found to have sarcoidosis after lymph node biopsy. sent to another neurologist at \[facility]\"" (Dr. Luciano ), had brain MRI that was reportedly normal.\par Between 2016 and 2018, was getting PFTs and they were stable. was never on rx for sarcoidosis.\par 6/2018 - developed SOB, went to [place], found to have anemia. 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 Since on hydrocortisone, feels well.\par MRI of pituitary 8/2018 revealed findings c/w neurosarcoidosis and ?possible meningioma (though I believe this may be a sarcoid nodule) in interhemispheric fissure with surrounding edema. Referred to neurology.\par 9/2018 - had LP showing atypical lymphocytes, wbc 27. had unremarkable CT abd/pelvis 10/2018. He also had CT chest in 6/2018 showing nodules that were thought related to prior sarcoid dx. Never got planned steroids and was lost to f/u X1y until 10/2019.\par \par S: Retired recently. No new neurological symptoms. Feels well other than chronic intermittent R facial numbness - used to be constant but now not as common. \par \par MEDS:\par synthroid 75mcg\par desmopressin\par prednisone 5mg daily\par testosteron\par \par O:\par AO3. Normally conversant. Follows commands, names, and repeats. Good attention.\par \par pupils reactive, slight asymmetry: 2mm OD, 3mm OS in normal light, VFF, EOMI, no nystagmus, face symmetric, TUP at midline. right face sensation is dec to PP with midline split and ear also affected, also with split at midline to VBS.\par \par Motor: \par  R: L:\par Del 5 5\par Bi 5 5\par Tri 5 5\par Wrist Extensors 5 5\par Finger abductors 5 5\par  5 5 \par \par HF 5 5\par KE 5 5\par KF 5 5\par DF 5 5\par PF 5 5\par \par Tone R L\par UE 0 0 \par LE 0 0\par \par Sensory RUE LUE RLE LLE \par LT + + + +\par Vib + + + +\par JPS + + + +\par PP + + + +\par Temp + + + +\par \par Reflexes:\par  R L \par Biceps 2 2\par BR 2 2\par Triceps 2 2\par Pat 2 2 \par AJ 2 2\par \par TOES WD WD\par \par \par Coordination:\par  R L \par FTN 0 0 \par EVON 0 0\par HTS 0 0 \par \par Other \par  \par Gait: normal, can heel/toe/tandem walk\par \par  Assistance: normal\par \par HIV negative\par \par MRI brain 8/2018 - diffuse leptomeningeal enhancement. interhemispheric lesion is read as ?meningioma vs dural met, though I believe it may also be c/w neurosarcoidosis. The dominant interhemispheric lesion may actually be a left frontal cortical lesion, as the edema is more pronounced in the left hemisphere and it is not clearly dural based. [id number] on my review.\par \par MRI brain 2016 - read as enhancing likely-extra axial mass, interhemispheric, likely meningioma but cannot r/o sarcoid. [id number]. read as some edema.\par \par CSF 9/2018\par WBC 27\par PCR neg\par crypto neg\par igg index high\par culture neg\par glucose 36\par prot 126\par fungal neg\par afb neg\par lactate neg\par LDH neg\par hiso neg\par VDRL neg\par [place] igg pos, igm neg\par ACE nl\par cytology with atypical lymphocytes\par \par AP: Neurosarcoidosis. I believe that the endocrinological pathology the pt has had is likely related to the neurosarcoid seen on prior MRI.\par \par Clinically improved.\par \par - new brain MRI w and wo for interval eval of neurosarcoidosis.\par - cont prednisone 5mg\par - RTC 2m

## 2019-10-24 ENCOUNTER — RX RENEWAL (OUTPATIENT)
Age: 54
End: 2019-10-24

## 2019-10-30 ENCOUNTER — RX RENEWAL (OUTPATIENT)
Age: 54
End: 2019-10-30

## 2019-12-09 ENCOUNTER — FORM ENCOUNTER (OUTPATIENT)
Age: 54
End: 2019-12-09

## 2019-12-10 ENCOUNTER — APPOINTMENT (OUTPATIENT)
Dept: MRI IMAGING | Facility: CLINIC | Age: 54
End: 2019-12-10
Payer: COMMERCIAL

## 2019-12-10 ENCOUNTER — OUTPATIENT (OUTPATIENT)
Dept: OUTPATIENT SERVICES | Facility: HOSPITAL | Age: 54
LOS: 1 days | End: 2019-12-10
Payer: COMMERCIAL

## 2019-12-10 DIAGNOSIS — Z00.8 ENCOUNTER FOR OTHER GENERAL EXAMINATION: ICD-10-CM

## 2019-12-10 PROCEDURE — A9585: CPT

## 2019-12-10 PROCEDURE — 70553 MRI BRAIN STEM W/O & W/DYE: CPT | Mod: 26

## 2019-12-10 PROCEDURE — 70553 MRI BRAIN STEM W/O & W/DYE: CPT

## 2019-12-11 ENCOUNTER — APPOINTMENT (OUTPATIENT)
Dept: NEUROLOGY | Facility: CLINIC | Age: 54
End: 2019-12-11
Payer: COMMERCIAL

## 2019-12-11 VITALS
WEIGHT: 188 LBS | HEART RATE: 77 BPM | SYSTOLIC BLOOD PRESSURE: 101 MMHG | HEIGHT: 68 IN | BODY MASS INDEX: 28.49 KG/M2 | DIASTOLIC BLOOD PRESSURE: 64 MMHG

## 2019-12-11 PROCEDURE — 99215 OFFICE O/P EST HI 40 MIN: CPT

## 2019-12-11 NOTE — HISTORY OF PRESENT ILLNESS
[FreeTextEntry1] : Initial hx 8/2018\par \par Retired in 9/2019\par 2014 - R sided facial numbness (no pain) after wisdom tooth extraction, involving the entire face even up to forehead and scalp. has been stable, persists to date. \par 2015 - Went to see neurologist who gave him pills for neuropathy. after got medication, began to have tingling and numbness in fingers, was told he has CTS. then switched medication which made him have mood alteration so he stopped that, then a 3rd medication began to make him dizzy so he stopped that one as well. \par 2015 - progressively worsening dizziness, SOB, generalized weakness, fatigue. went to [facility], admitted x8d, found anemia.\par 2016 - referred to pulm, found to have sarcoidosis after lymph node biopsy. sent to another neurologist at \[facility]\"" (Dr. Luciano ), had brain MRI that was reportedly normal.\par Between 2016 and 2018, was getting PFTs and they were stable. was never on rx for sarcoidosis.\par 6/2018 - developed SOB, went to [place], found to have anemia. 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 Since on hydrocortisone, feels well.\par MRI of pituitary 8/2018 revealed findings c/w neurosarcoidosis and ?possible meningioma (though I believe this may be a sarcoid nodule) in interhemispheric fissure with surrounding edema. Referred to neurology.\par 9/2018 - had LP showing atypical lymphocytes, wbc 27. had unremarkable CT abd/pelvis 10/2018. He also had CT chest in 6/2018 showing nodules that were thought related to prior sarcoid dx. Never got planned steroids and was lost to f/u X1y until 10/2019.\par \par S: Retired in 2019, going "beautifully". No new neurological symptoms. Feels well other than chronic intermittent R facial numbness - used to be constant but now not as common. \par \par MEDS:\par synthroid 75mcg\par desmopressin\par prednisone 5mg daily\par testosterone\par \par O:\par AO3. Normally conversant. Follows commands, names, and repeats. Good attention.\par \par pupils reactive, slight asymmetry: 2mm OD, 3mm OS in normal light, VFF, EOMI, no nystagmus, face symmetric, TUP at midline. \par \par Motor: \par  R: L:\par Del 5 5\par Bi 5 5\par Tri 5 5\par Wrist Extensors 5 5\par Finger abductors 5 5\par  5 5 \par \par HF 5 5\par KE 5 5\par KF 5 5\par DF 5 5\par PF 5 5\par \par Tone R L\par UE 0 0 \par LE 0 0\par \par Sensory RUE LUE RLE LLE \par LT + + + +\par Vib + + + +\par JPS + + + +\par PP + + + +\par Temp + + + +\par \par Reflexes:\par  R L \par Biceps 2 2\par BR 2 2\par Triceps 2 2\par Pat 2 2 \par AJ 2 2\par \par TOES WD WD\par \par \par Coordination:\par  R L \par FTN 0 0 \par EVON 0 0\par HTS 0 0 \par \par Other \par  \par Gait: normal, can heel/toe/tandem walk\par \par  Assistance: normal\par \par HIV negative\par \par MRI brain 12/2019 - report not yet available. on my review, notable for slight growth of frontal interhemispheric lesion with increase in surrounding edema. diffuse leptomeningeal enhancement is similar on my review.\par \par MRI brain 8/2018 - diffuse leptomeningeal enhancement. interhemispheric lesion is read as ?meningioma vs dural met, though I believe it may also be c/w neurosarcoidosis. The dominant interhemispheric lesion may actually be a left frontal cortical lesion, as the edema is more pronounced in the left hemisphere and it is not clearly dural based. [id number] on my review.\par \par MRI brain 2016 - read as enhancing likely-extra axial mass, interhemispheric, likely meningioma but cannot r/o sarcoid. [id number]. read as some edema.\par \par CSF 9/2018\par WBC 27\par PCR neg\par crypto neg\par igg index high\par culture neg\par glucose 36\par prot 126\par fungal neg\par afb neg\par lactate neg\par LDH neg\par hiso neg\par VDRL neg\par [place] igg pos, igm neg\par ACE nl\par cytology with atypical lymphocytes\par \par AP: Neurosarcoidosis. I believe that the endocrinological pathology the pt has had is likely related to the neurosarcoid seen on prior MRI.\par \par Clinically improved.\par \par MRI 12/2019 demonstrated worsening of dominant interhemispheric lesion, ? meningioma vs neurosarcoid. A unifying dx would be neurosarcoid given biopsy evidence of sarcoid from lymph node and leptomeningeal enhancement on imaging; however, cannot r/o a malignant meningioma.\par \par - IVMP x5d, then slow pred taper starting at 60mg\par - repeat brain MRI in 3m\par - referral to Dr. Thayer for eval of meningioma.\par - RTC 3m

## 2019-12-12 ENCOUNTER — APPOINTMENT (OUTPATIENT)
Dept: NEUROSURGERY | Facility: AMBULATORY SURGERY CENTER | Age: 54
End: 2019-12-12
Payer: COMMERCIAL

## 2019-12-12 ENCOUNTER — APPOINTMENT (OUTPATIENT)
Dept: NEUROLOGY | Facility: CLINIC | Age: 54
End: 2019-12-12
Payer: COMMERCIAL

## 2019-12-12 VITALS
WEIGHT: 187 LBS | HEIGHT: 68 IN | TEMPERATURE: 98 F | DIASTOLIC BLOOD PRESSURE: 74 MMHG | HEART RATE: 79 BPM | RESPIRATION RATE: 18 BRPM | OXYGEN SATURATION: 99 % | SYSTOLIC BLOOD PRESSURE: 113 MMHG | BODY MASS INDEX: 28.34 KG/M2

## 2019-12-12 VITALS
RESPIRATION RATE: 18 BRPM | OXYGEN SATURATION: 99 % | HEART RATE: 71 BPM | SYSTOLIC BLOOD PRESSURE: 122 MMHG | TEMPERATURE: 98.6 F | DIASTOLIC BLOOD PRESSURE: 78 MMHG

## 2019-12-12 DIAGNOSIS — D86.89 SARCOIDOSIS OF OTHER SITES: ICD-10-CM

## 2019-12-12 DIAGNOSIS — G96.19 OTHER DISORDERS OF MENINGES, NOT ELSEWHERE CLASSIFIED: ICD-10-CM

## 2019-12-12 DIAGNOSIS — D32.9 BENIGN NEOPLASM OF MENINGES, UNSPECIFIED: ICD-10-CM

## 2019-12-12 PROCEDURE — 99215 OFFICE O/P EST HI 40 MIN: CPT

## 2019-12-12 PROCEDURE — 99203 OFFICE O/P NEW LOW 30 MIN: CPT

## 2019-12-12 NOTE — PHYSICAL EXAM
[FreeTextEntry1] : Mr. Rivera is awake and alert - oriented and pleasant.\par He know the month and the year. He is fluent with normal reception.\par He can spell the word HOUSE forwards and backwards.\par He had trouble with serial 7's (stopped at 93) but was able to quickly say how many quarters were in a dollar and how many nickel were in a dollar.\par Memory initially at 5 min was 2/3. Repeated was 3/3.\par \par PERRL, EOMI, VFF\par Face symmetric and tongue protrudes midline\par There is no drift.\par FFM are equal bilaterally\par Strength is full in UE and LE bilaterally.\par Coordination is intact to Junior and FNF bilaterally.\par Reflexes reduced but symmetric.\par Gait is narrow-based and steady.\par Able to toe and heel walk. Mild instability with tandem.

## 2019-12-12 NOTE — HISTORY OF PRESENT ILLNESS
[FreeTextEntry1] : Mr. Rafal Rivera is a 55 yo right handed man referred by Dr. Reagan for evaluation of an enlarging anterior interhemipheric mass.\par \par Patient was initially seen by Dr. Reagan in August, 2018 after an abnormal MRI in 8/18 showing leptomeningeal enhancement and an anterior interhemispheric nodule. He has a PMH notable for sarcoidosis. By chart review, I note this was diagnosed in 2016 after a lymph node biopsy reportedly at [facility]. By his report, as well as according to the notes, he has not received any pulmonary treatment and has not had any pulmonary issues.\par In 2018 he was SOB and found to be anemic (Hb 10.1) with an elevated ESR (45) and TSH 1.16. He had reported an unintentional 70 pound weight loss over 18 months. He was found to have secondary adrenal insufficiency and central hypothyroidism.\par He had a lumbar puncture in 9/18 that showed atypical lymphocytes, WBC 27, protein 126, neg fungus, neg AFB, ACE < 5 U/L, and negative cytology/flow cytometry.\par He was then seen yesterday in follow up with Dr. Reagan - a repeat MRI brain performed on 12/10 showed increased size of the interhemispheric lesion - now measuring 2 x 1.9 x 2.1 cm with increase associated edema. He also has progression of his leptomeningeal enhancement.\par \par He is here  today by himself. He admits to increased headaches over the past few months - worse when he lays down. He also noted that his wife finds him forgetful although he does not point to specific examples of this. He retired 2 months ago () but denies this was because of any issues at work. He reports spending most of the day in bed - he admits that he used to be more "on the go" but not any more. He also says that he mostly takes his prescribed medication but not consistently at the same time of day as he often falls asleep.

## 2019-12-12 NOTE — DISCUSSION/SUMMARY
[FreeTextEntry1] : In summary, this is a 55 yo man with a reported history of sarcoid - with progressed leptomeningeal enhancement as well as an enlarging interhemipsheric nodule - he is symptomatic with increased headaches and lethargy/poor motivation.\par Differential of the mass includes neurosarcoid, meningioma, hemagiopericytoma, and dural metastasis.\par Would have neurosurgery evaluate but may be worthwhile to give a trial of high dose steroids and re-imaging in 6 weeks.\par In addition, would recommend having rheumatology involved.\par Will discuss with Dr. Reagan.

## 2019-12-17 ENCOUNTER — CHART COPY (OUTPATIENT)
Age: 54
End: 2019-12-17

## 2019-12-18 ENCOUNTER — MEDICATION RENEWAL (OUTPATIENT)
Age: 54
End: 2019-12-18

## 2019-12-27 PROBLEM — G96.19 LEPTOMENINGEAL DISEASE: Status: ACTIVE | Noted: 2018-08-24

## 2019-12-27 PROBLEM — D32.9 MENINGIOMA: Status: ACTIVE | Noted: 2018-10-03

## 2019-12-27 PROBLEM — D86.89 NEUROSARCOIDOSIS: Status: ACTIVE | Noted: 2018-08-22

## 2019-12-27 NOTE — REASON FOR VISIT
[New Patient Visit] : a new patient visit [FreeTextEntry1] : parafalcine meningioma with cerebral edema

## 2019-12-27 NOTE — PHYSICAL EXAM
[General Appearance - Alert] : alert [General Appearance - In No Acute Distress] : in no acute distress [Affect] : the affect was normal [Oriented To Time, Place, And Person] : oriented to person, place, and time [Impaired Insight] : insight and judgment were intact [Person] : oriented to person [Place] : oriented to place [Time] : oriented to time [Short Term Intact] : short term memory intact [Remote Intact] : remote memory intact [Span Intact] : the attention span was normal [Concentration Intact] : normal concentrating ability [Fluency] : fluency intact [Comprehension] : comprehension intact [Current Events] : adequate knowledge of current events [Past History] : adequate knowledge of personal past history [Vocabulary] : adequate range of vocabulary [Cranial Nerves Optic (II)] : visual acuity intact bilaterally,  pupils equal round and reactive to light [Cranial Nerves Trigeminal (V)] : facial sensation intact symmetrically [Cranial Nerves Oculomotor (III)] : extraocular motion intact [Cranial Nerves Facial (VII)] : face symmetrical [Cranial Nerves Vestibulocochlear (VIII)] : hearing was intact bilaterally [Cranial Nerves Glossopharyngeal (IX)] : tongue and palate midline [Cranial Nerves Accessory (XI - Cranial And Spinal)] : head turning and shoulder shrug symmetric [Cranial Nerves Hypoglossal (XII)] : there was no tongue deviation with protrusion [Motor Strength] : muscle strength was normal in all four extremities [No Muscle Atrophy] : normal bulk in all four extremities [Motor Handedness Right-Handed] : the patient is right hand dominant [Sensation Tactile Decrease] : light touch was intact [Romberg's Sign] : Romberg's sign was negtive [Past-pointing] : there was no past-pointing [Limited Balance] : balance was intact [Balance] : balance was intact [Tremor] : no tremor present [2+] : Patella left 2+ [Sclera] : the sclera and conjunctiva were normal [Extraocular Movements] : extraocular movements were intact [PERRL With Normal Accommodation] : pupils were equal in size, round, reactive to light, with normal accommodation [Outer Ear] : the ears and nose were normal in appearance [Oropharynx] : the oropharynx was normal [Neck Cervical Mass (___cm)] : no neck mass was observed [Neck Appearance] : the appearance of the neck was normal [Jugular Venous Distention Increased] : there was no jugular-venous distention [Thyroid Diffuse Enlargement] : the thyroid was not enlarged [Thyroid Nodule] : there were no palpable thyroid nodules [Abnormal Walk] : normal gait [Musculoskeletal - Swelling] : no joint swelling seen [Nail Clubbing] : no clubbing  or cyanosis of the fingernails [Motor Tone] : muscle strength and tone were normal [Skin Color & Pigmentation] : normal skin color and pigmentation [Skin Turgor] : normal skin turgor [] : no rash

## 2019-12-27 NOTE — PLAN
[FreeTextEntry1] : Discussion:\par - Reviewed MRI findings. 2cm x 1.9 cm x 2.1 cm extra axial lesion left anterior interhemispheric falx that has increased in size since 2016. Cerebral edema around the lesion has increased significantly.\par - Sarcoid granuloma vs benign meningioma. Reviewed both diagnosis and treatments.\par - Patient is asymptomatic at this time.\par - Reviewed patient's options:\par      1. Observe with close MRI follow-up and treat when symptoms develop. Patient can expect symptoms to develop over time within the next 1-2 years.\par      2. Biospy lesion- pathology results would give a definitves diagnosis for treatment planning. Sarcoid would be treated medically plus radiation; meningioma treated with surgical resection or focused radiation.\par      3. Resection of entire tumor with goal of GTR. This is a more complex surgery than a biopsy.\par Risks, benefits and alternatives were discussed. All questions answered for each option.\par Recommended at this time is biopsy for an informed decision for treatment planning. \par Patient will be taking a trial of high dose steroids as recommended by Malou Reagan and Jeovany. If the lesion responds to this treatment on MRI in a month, this would likely indicate a sarcoid lesion. Treatment options will be reviewed again at that time.\par

## 2019-12-27 NOTE — DATA REVIEWED
[de-identified] : \par \par EXAM: MR BRAIN WAW IC \par \par \par PROCEDURE DATE: 12/10/2019 \par \par \par \par INTERPRETATION: \par CLINICAL INDICATION:Sarcoidosis, secondary to renal insufficiency, \par hypothyroidism, follow-up sella turcica and mass effect along the \par interhemispheric falx \par \par TECHNIQUE: Contrast enhanced MRI of the brain was performed. \par \par Sagittal and axial T1, axial T2, FLAIR, gradient echo, contrast-enhanced \par axial T1, T1 MPRAGE sequence in addition to diffusion weighted imaging and \par ADC map were obtained. \par \par 8.5 cc of Gadavist gadolinium uneventfully intravenously administered. 1.5 \par cc was discarded. \par \par COMPARISON: MRI brain 8/11/2018, 4/6/2016, \par \par FINDINGS: \par \par Increased size of now 2.0 x 1.9 x 2.1 cm, (AP, TR, CC) enhancing extra-axial \par lesion at left anterior interhemispheric fissure, as well as increased \par surrounding increased surrounding vasogenic edema, the mass demonstrates \par decreased T2, ADC, GRE signal, and isointense T1 and FLAIR signal, \par previously measuring on 8/11/2018 1.3 x 1.5 x 1.3 cm, ( AP, TR, CC). There \par is increased mass effect along the anterior body and January of the corpus \par callosum which are displaced inferiorly, with abnormal increased T2 and \par FLAIR hyperintensity extending along the corpus callosum splenium to the \par right of midline. Differential diagnostic considerations again include an \par extra-axial meningioma, atypical dural based metastasis or \par hemangiopericytoma with interval increase in size from prior. \par \par Ventricles and sulci again demonstrate mild prominence consistent with \par volume loss, new hydrocephalus. Interval Increased abnormal leptomeningeal \par enhancement in the left greater than right convexity throughout the \par bilateral frontal and parietal lobes, cerebellar hemispheres, \par perimesencephalic and basal cisterns, with extension inferiorly to the \par medullary pyramids and upper cervical cord, with increased irregular \par enhancement along the optic chiasm and proximal optic radiations, with \par asymmetric abnormal thickening and enhancement at left tentorial leaflet, \par correlate with known sarcoidosis. Dedicated imaging of the cervical spinal \par cord may better delineate the enhancement along the along the cord, \par incompletely visualized on brain MRI. \par \par There is no new diffusion signal abnormality to suggest acute or subacute \par infarction. There is no new extra-axial hemorrhage or midline shift, basal \par cisterns remain patent. Unchanged partially empty sella with abnormal \par enhancement along the infundibulum extending to the optic chiasm, (19:75). \par There is no new intraparenchymal hematoma or midline shift. No new abnormal \par extra-axial fluid collections are present. Major flow voids at the base of \par the brain follow expected course and caliber. \par \par Calvarium and orbits are unchanged, mild paranasal sinus mucosal thickening, \par minimal left mastoid opacification, unchanged hard palate prominent torus \par palatini \par \par IMPRESSION: \par \par Progressive increased leptomeningeal enhancement concerning for \par neurosarcoidosis progression, cervical cord MRI with gadolinium may be \par obtained as clinically warranted. \par \par Increased size 2 x 1.9 x 2.1 cm, (AP, TR, CC) enhancing extra-axial lesion \par at the left anterior interhemispheric falx, with increased vasogenic edema, \par differential again includes, a meningioma, dural based metastasis, atypical \par sarcoid granuloma, hemangiopericytoma positional extra-axial masses not \par excluded. \par \par Partially empty sella with enhancement noted along the infundibulum and \par optic chiasm. \par \par \par \par \par \par \par \par \par LELAND DELUCA M.D., ATTENDING RADIOLOGIST \par This document has been electronically signed. Dec 11 2019 8:43PM \par \par \par \par \par \par \par \par \par \par    \par \par \par \par \par \par \par \par \par \par \par \par \par \par \par \par

## 2020-01-02 ENCOUNTER — RX RENEWAL (OUTPATIENT)
Age: 55
End: 2020-01-02

## 2020-01-27 ENCOUNTER — APPOINTMENT (OUTPATIENT)
Dept: NEUROLOGY | Facility: CLINIC | Age: 55
End: 2020-01-27
Payer: COMMERCIAL

## 2020-01-27 VITALS — HEART RATE: 75 BPM | SYSTOLIC BLOOD PRESSURE: 130 MMHG | RESPIRATION RATE: 16 BRPM | DIASTOLIC BLOOD PRESSURE: 64 MMHG

## 2020-01-27 PROCEDURE — 96365 THER/PROPH/DIAG IV INF INIT: CPT

## 2020-01-27 PROCEDURE — S0028: CPT

## 2020-01-28 ENCOUNTER — APPOINTMENT (OUTPATIENT)
Dept: NEUROLOGY | Facility: CLINIC | Age: 55
End: 2020-01-28
Payer: COMMERCIAL

## 2020-01-28 PROCEDURE — 96365 THER/PROPH/DIAG IV INF INIT: CPT

## 2020-01-28 PROCEDURE — S0028: CPT

## 2020-01-29 ENCOUNTER — APPOINTMENT (OUTPATIENT)
Dept: NEUROLOGY | Facility: CLINIC | Age: 55
End: 2020-01-29
Payer: COMMERCIAL

## 2020-01-29 PROCEDURE — S0028: CPT

## 2020-01-29 PROCEDURE — 96366 THER/PROPH/DIAG IV INF ADDON: CPT

## 2020-01-29 PROCEDURE — 96365 THER/PROPH/DIAG IV INF INIT: CPT

## 2020-01-30 ENCOUNTER — APPOINTMENT (OUTPATIENT)
Dept: NEUROLOGY | Facility: CLINIC | Age: 55
End: 2020-01-30
Payer: COMMERCIAL

## 2020-01-30 VITALS — SYSTOLIC BLOOD PRESSURE: 116 MMHG | HEART RATE: 59 BPM | RESPIRATION RATE: 16 BRPM | DIASTOLIC BLOOD PRESSURE: 61 MMHG

## 2020-01-30 PROCEDURE — S0028: CPT

## 2020-01-30 PROCEDURE — 96365 THER/PROPH/DIAG IV INF INIT: CPT

## 2020-01-30 RX ORDER — FAMOTIDINE 10 MG/ML
20 INJECTION, SOLUTION INTRAVENOUS
Refills: 0 | Status: COMPLETED | OUTPATIENT
Start: 2020-01-30

## 2020-01-30 RX ORDER — METHYLPREDNISOLONE SODIUM SUCCINATE 500 MG/ML
500 INJECTION, POWDER, FOR SOLUTION INTRAMUSCULAR; INTRAVENOUS
Refills: 0 | Status: COMPLETED | OUTPATIENT
Start: 2020-01-30

## 2020-01-30 RX ADMIN — METHYLPREDNISOLONE SODIUM SUCCINATE 0 MG: 500 INJECTION, POWDER, FOR SOLUTION INTRAMUSCULAR; INTRAVENOUS at 00:00

## 2020-01-31 ENCOUNTER — APPOINTMENT (OUTPATIENT)
Dept: NEUROLOGY | Facility: CLINIC | Age: 55
End: 2020-01-31
Payer: COMMERCIAL

## 2020-01-31 PROCEDURE — S0028: CPT

## 2020-01-31 PROCEDURE — 96365 THER/PROPH/DIAG IV INF INIT: CPT

## 2020-01-31 PROCEDURE — 96366 THER/PROPH/DIAG IV INF ADDON: CPT

## 2020-02-24 ENCOUNTER — RX RENEWAL (OUTPATIENT)
Age: 55
End: 2020-02-24

## 2020-02-25 NOTE — ADDENDUM
[FreeTextEntry1] : Recent test results scanned into chart. Sodium 140 mmol/L. Free T4 1.41 ng/dL (normal: 0.80-1.90) with undetectable TSH; patient has central hypothyroidism and will continue current regimen of levothyroxine with free T4 mid-normal range. Testosterone 398 ng/dL and will continue current regimen of testosterone therapy. Hemoglobin/hematocrit 12.7 mg/dL/37.1% with prostate specific antigen within range. I left a message for call back to discuss. 10/24/19\par \par Mr. Rivera called because he wanted a trial off desmopressin. We discussed that he can come off desmopressin as long as he is able to drink to thirst and keep intake even with urine output. Of note, he has been on high dose steroid therapy as per Dr. Reagan. 2/25/20

## 2020-02-25 NOTE — HISTORY OF PRESENT ILLNESS
[FreeTextEntry1] : Mr. Rivera is a 53 year-old man with a history of sarcoidosis, normocytic anemia, erectile dysfunction presenting for follow-up. I saw him for an initial visit in July 2018 and last in February 2019. 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 Reagan.\par \par Adrenal insufficiency.\par At his initial visit, he complained of an unintentional 70-pound weight loss starting in October 2014. He had loss of appetite, early satiety, nausea/vomiting with many foods, constipation. He had a an endoscopy/colonoscopy for evaluation of his symptoms per prior notes.\par At his initial visit he also complained of dizziness when rising from supine or seated positions, and had a fainting episode. He complained of occasional headaches, dyspnea on exertion, cold intolerance. \par We started hydrocortisone 10 mg/5 mg daily after cosyntropin stimulation testing at his initial visit, significant for an inappropriate response (less than 18 mcg/dL after cosyntropin) with relatively low ACTH. \par He was not able to take hydrocortisone twice daily and we switched to prednisone 5 mg daily.\par \par Central hypothyroidism.\par Free T4 was low, with an inappropriately normal TSH, consistent with central hypothyroidism.\par We started levothyroxine 75 mcg daily after starting hydrocortisone.\par He is taking levothyroxine in the morning, on an empty stomach, with plain water, and waiting at least 30 minutes before eating. He is not taking calcium/iron/multivitamin. \par Free T4 has been mid-range on this regimen.\par \par Hypogonadotrophic hypogonadism. \par He complains of symptoms of erectile dysfunction starting around 2016.\par He complained of decreased morning erections and difficulty maintaining erections. He denies change in libido, shaving frequency, muscle weakness.\par He was noted to have undetectable testosterone and low FSH and LH in August 2018.\par He started testosterone 5 gm daily in October 2018 after issues with obtaining authorization through his insurance.\par \par Diabetes insipidus.\par He noted significantly increase urine output after starting hydrocortisone. The issue was brought to my attention in October 2018. \par We started desmopressin 0.05 (1/2 0.1 mg) pill once daily in October 2018, subsequently twice daily due to incomplete symptom relief. \par He takes 0.1 mg daily since he was unable to take medications twice daily due to work.\par \par Interim History \par He feels well today. Urine output is under good control on his regimen. Symptoms of erectile dysfunction are starting to improve on testosterone therapy. He recently retired and is happy. He is considering what to do next.\par Medical and surgical history, medications, allergies, social and family history reviewed and updated as needed.

## 2020-02-25 NOTE — PHYSICAL EXAM
[Alert] : alert [No Acute Distress] : no acute distress [Healthy Appearance] : healthy appearance [Normal Sclera/Conjunctiva] : normal sclera/conjunctiva [Visual Fields Grossly Intact] : visual fields grossly intact [Normal Oropharynx] : the oropharynx was normal [No Neck Mass] : no neck mass was observed [Supple] : the neck was supple [No LAD] : no lymphadenopathy [Thyroid Not Enlarged] : the thyroid was not enlarged [No Thyroid Nodules] : there were no palpable thyroid nodules [Normal Rate and Effort] : normal respiratory rhythm and effort [Clear to Auscultation] : lungs were clear to auscultation bilaterally [Normal Rate] : heart rate was normal  [Normal S1, S2] : normal S1 and S2 [Regular Rhythm] : with a regular rhythm [No Edema] : there was no peripheral edema [No Stigmata of Cushings Syndrome] : no stigmata of cushings syndrome [Normal Gait] : normal gait [Normal Insight/Judgement] : insight and judgment were intact [Kyphosis] : no kyphosis present [Acanthosis Nigricans] : no acanthosis nigricans [de-identified] : +/- palmar hyperpigmentation

## 2020-02-25 NOTE — ASSESSMENT
[FreeTextEntry1] : Adrenal insufficiency. Cosyntropin stimulation testing at his initial visit was significant for an inappropriate response with relatively low ACTH. He is taking prednisone 5 mg daily. We reviewed proper use and compliance with prednisone. \par Continue prednisone 5 mg daily\par Patient advised to take 3 times normal dose with illness or stress\par Patient advised to go to the emergency department immediately if he has nausea and cannot take prednisone\par Patient was again advised to obtain a medical alert bracelet\par \par Central hypothyroidism. Free T4 was low, with an inappropriately normal TSH, consistent with central hypothyroidism. We reviewed proper use and compliance with levothyroxine.\par Continue levothyroxine 75 mcg daily\par Check free T4; TSH is not reliable in central hypothyroidism\par \par Secondary hypogonadism. He was noted to have undetectable testosterone and low FSH and LH.\par Continue testosterone 5 gm daily\par Check testosterone, blood counts, prostates specific antigen\par \par Diabetes insipidus. He was diagnosed with diabetes insipidus, unmasked by hydrocortisone.\par Continue desmopressin 0.1 mg daily\par Reviewed need to only drink to thirst with desmopressin\par Check metabolic panel today\par \par Neurosarcoidosis. I advised him to make a follow-up appointment with Dr. Reagan as soon as possible.\par \par Return to clinic in 3 months. I advised him to have a few of his pills with him at all times so that he does not forget to take. Patient advised to call me immediately with any questions or concerns.\par \par Instructions given to patient:\par Low cortisol\par Take prednisone 5 mg (1 pill) every day\par If you get sick, take 3 pills a day and call me and your primary care doctor right away\par \par Low thyroid\par Take levothyroxine 75 mcg (1 pill) every day\par Take it on an empty stomach, with water only, and wait 30 minutes before eating or taking other pills\par \par For the high urination\par Take desmopressin 0.1 mg (1 tablet) every day\par \par Low testosterone\par Take packet of testosterone gel every day\par Have pharmacy call me every month for a refill of your prescription\par \par CC:\par Dr. Karthikeyan Spain, Fax [phone]

## 2020-03-02 ENCOUNTER — RX RENEWAL (OUTPATIENT)
Age: 55
End: 2020-03-02

## 2020-03-05 ENCOUNTER — OUTPATIENT (OUTPATIENT)
Dept: OUTPATIENT SERVICES | Facility: HOSPITAL | Age: 55
LOS: 1 days | End: 2020-03-05
Payer: COMMERCIAL

## 2020-03-05 ENCOUNTER — APPOINTMENT (OUTPATIENT)
Dept: MRI IMAGING | Facility: IMAGING CENTER | Age: 55
End: 2020-03-05
Payer: COMMERCIAL

## 2020-03-05 DIAGNOSIS — D86.89 SARCOIDOSIS OF OTHER SITES: ICD-10-CM

## 2020-03-05 PROCEDURE — 70553 MRI BRAIN STEM W/O & W/DYE: CPT | Mod: 26

## 2020-03-05 PROCEDURE — 70553 MRI BRAIN STEM W/O & W/DYE: CPT

## 2020-04-01 ENCOUNTER — APPOINTMENT (OUTPATIENT)
Dept: NEUROLOGY | Facility: CLINIC | Age: 55
End: 2020-04-01

## 2020-04-08 ENCOUNTER — APPOINTMENT (OUTPATIENT)
Dept: ENDOCRINOLOGY | Facility: CLINIC | Age: 55
End: 2020-04-08

## 2020-05-26 ENCOUNTER — RX RENEWAL (OUTPATIENT)
Age: 55
End: 2020-05-26

## 2020-06-19 ENCOUNTER — OUTPATIENT (OUTPATIENT)
Dept: OUTPATIENT SERVICES | Facility: HOSPITAL | Age: 55
LOS: 1 days | End: 2020-06-19
Payer: COMMERCIAL

## 2020-06-19 ENCOUNTER — APPOINTMENT (OUTPATIENT)
Dept: MRI IMAGING | Facility: CLINIC | Age: 55
End: 2020-06-19
Payer: COMMERCIAL

## 2020-06-19 DIAGNOSIS — Z00.8 ENCOUNTER FOR OTHER GENERAL EXAMINATION: ICD-10-CM

## 2020-06-19 PROCEDURE — 70553 MRI BRAIN STEM W/O & W/DYE: CPT | Mod: 26

## 2020-06-19 PROCEDURE — 70553 MRI BRAIN STEM W/O & W/DYE: CPT

## 2020-06-24 ENCOUNTER — APPOINTMENT (OUTPATIENT)
Dept: NEUROLOGY | Facility: CLINIC | Age: 55
End: 2020-06-24
Payer: COMMERCIAL

## 2020-06-24 PROCEDURE — 99214 OFFICE O/P EST MOD 30 MIN: CPT | Mod: 95

## 2020-06-24 NOTE — HISTORY OF PRESENT ILLNESS
[Other Location: e.g. School (Enter Location, City,State)___] : at [unfilled], at the time of the visit. [Medical Office: ([facility])___] : at the medical office located in  [FreeTextEntry1] : Initial hx 8/2018\par \par Retired in 9/2019\par 2014 - R sided facial numbness (no pain) after wisdom tooth extraction, involving the entire face even up to forehead and scalp. has been stable, persists to date. \par 2015 - Went to see neurologist who gave him pills for neuropathy. after got medication, began to have tingling and numbness in fingers, was told he has CTS. then switched medication which made him have mood alteration so he stopped that, then a 3rd medication began to make him dizzy so he stopped that one as well. \par 2015 - progressively worsening dizziness, SOB, generalized weakness, fatigue. went to [facility], admitted x8d, found anemia.\par 2016 - referred to pulm, found to have sarcoidosis after lymph node biopsy. sent to another neurologist at [facility] (Dr. Luciano ), had brain MRI that was reportedly normal.\par Between 2016 and 2018, was getting PFTs and they were stable. was never on rx for sarcoidosis.\par 6/2018 - developed SOB, went to [place], found to have anemia. 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 Since on hydrocortisone, feels well.\par MRI of pituitary 8/2018 revealed findings c/w neurosarcoidosis and ?possible meningioma (though I believe this may be a sarcoid nodule) in interhemispheric fissure with surrounding edema. Referred to neurology.\par 9/2018 - had LP showing atypical lymphocytes, wbc 27. had unremarkable CT abd/pelvis 10/2018. He also had CT chest in 6/2018 showing nodules that were thought related to prior sarcoid dx. Never got planned steroids and was lost to f/u X1y until 10/2019.\par \par S: Fully retired, going "beautifully". No new neurological symptoms. Feels well other than chronic intermittent R facial numbness - used to be constant but now not as common. \par \par MEDS:\par synthroid 75mcg\par desmopressin\par prednisone 5mg daily\par testosterone\par \par O:\par AO3. Normally conversant. Follows commands, names, and repeats. Good attention.\par \par HIV negative\par \par MRI brain 6/2020 - interhemispheric lesion continues to shrink substantially. however, some small new areas of lepto.\par \par MRI brain 3/2020 - reviewed, lepto has almost resolved, and large interhemispheric lesion is much improved, edema much improved.\par \par MRI brain 12/2019 - report not yet available. on my review, notable for slight growth of frontal interhemispheric lesion with increase in surrounding edema. diffuse leptomeningeal enhancement is similar on my review.\par \par MRI brain 8/2018 - diffuse leptomeningeal enhancement. interhemispheric lesion is read as ?meningioma vs dural met, though I believe it may also be c/w neurosarcoidosis. The dominant interhemispheric lesion may actually be a left frontal cortical lesion, as the edema is more pronounced in the left hemisphere and it is not clearly dural based. [id number] on my review.\par \par MRI brain 2016 - read as enhancing likely-extra axial mass, interhemispheric, likely meningioma but cannot r/o sarcoid. [id number]. read as some edema.\par \par CSF 9/2018\par WBC 27\par PCR neg\par crypto neg\par igg index high\par culture neg\par glucose 36\par prot 126\par fungal neg\par afb neg\par lactate neg\par LDH neg\par hiso neg\par VDRL neg\par [place] igg pos, igm neg\par ACE nl\par cytology with atypical lymphocytes\par \par AP: Neurosarcoidosis. I believe that the endocrinological pathology the pt has had is likely related to the neurosarcoid seen on prior MRI.\par \par Clinically improved.\par \par MRI 6/2020 continues to show improvement of interhemispheric lesion, making this most consistent with manifestation of neurosarcoidosis than a primary brain tumor such as meningioma. However, there are areas of lepto that had not been present before.\par \par Education provided. All questions answered.\par \par - increase prednisone to 10mg daily\par - repeat brain MRI in 3m\par - if no improvement or worsening at 3m, will add cellcept 500bid\par - RTC 3m [Verbal consent obtained from patient] : the patient, [unfilled]

## 2020-08-24 ENCOUNTER — RX RENEWAL (OUTPATIENT)
Age: 55
End: 2020-08-24

## 2020-08-28 ENCOUNTER — RX RENEWAL (OUTPATIENT)
Age: 55
End: 2020-08-28

## 2020-10-06 ENCOUNTER — APPOINTMENT (OUTPATIENT)
Dept: ENDOCRINOLOGY | Facility: CLINIC | Age: 55
End: 2020-10-06
Payer: COMMERCIAL

## 2020-10-06 VITALS
DIASTOLIC BLOOD PRESSURE: 80 MMHG | HEART RATE: 83 BPM | BODY MASS INDEX: 29.25 KG/M2 | WEIGHT: 193 LBS | HEIGHT: 68 IN | SYSTOLIC BLOOD PRESSURE: 129 MMHG

## 2020-10-06 PROCEDURE — 99215 OFFICE O/P EST HI 40 MIN: CPT

## 2020-10-06 RX ORDER — FAMOTIDINE 20 MG/1
20 TABLET, FILM COATED ORAL
Qty: 30 | Refills: 3 | Status: DISCONTINUED | COMMUNITY
Start: 2019-12-11 | End: 2020-10-06

## 2020-10-06 RX ORDER — METHYLPREDNISOLONE SODIUM SUCCINATE 500 MG/8ML
500 INJECTION INTRAMUSCULAR; INTRAVENOUS DAILY
Qty: 10 | Refills: 0 | Status: DISCONTINUED | COMMUNITY
Start: 2019-12-18 | End: 2020-10-06

## 2020-10-06 RX ORDER — PREDNISONE 10 MG/1
10 TABLET ORAL
Qty: 120 | Refills: 0 | Status: DISCONTINUED | COMMUNITY
Start: 2019-12-11 | End: 2020-10-06

## 2020-10-06 RX ORDER — PREDNISONE 5 MG/1
5 TABLET ORAL
Qty: 90 | Refills: 1 | Status: DISCONTINUED | COMMUNITY
Start: 2020-03-02 | End: 2020-10-06

## 2020-10-06 RX ORDER — DESMOPRESSIN ACETATE 0.1 MG/1
0.1 TABLET ORAL DAILY
Qty: 90 | Refills: 0 | Status: DISCONTINUED | COMMUNITY
Start: 2018-10-03 | End: 2020-10-06

## 2020-10-12 ENCOUNTER — TRANSCRIPTION ENCOUNTER (OUTPATIENT)
Age: 55
End: 2020-10-12

## 2020-10-28 ENCOUNTER — APPOINTMENT (OUTPATIENT)
Dept: MRI IMAGING | Facility: CLINIC | Age: 55
End: 2020-10-28

## 2020-10-28 ENCOUNTER — OUTPATIENT (OUTPATIENT)
Dept: OUTPATIENT SERVICES | Facility: HOSPITAL | Age: 55
LOS: 1 days | End: 2020-10-28

## 2020-10-28 DIAGNOSIS — Z00.8 ENCOUNTER FOR OTHER GENERAL EXAMINATION: ICD-10-CM

## 2020-11-16 DIAGNOSIS — Z20.828 CONTACT WITH AND (SUSPECTED) EXPOSURE TO OTHER VIRAL COMMUNICABLE DISEASES: ICD-10-CM

## 2020-11-17 ENCOUNTER — TRANSCRIPTION ENCOUNTER (OUTPATIENT)
Age: 55
End: 2020-11-17

## 2020-11-18 LAB — SARS-COV-2 N GENE NPH QL NAA+PROBE: NOT DETECTED

## 2020-11-18 NOTE — HISTORY OF PRESENT ILLNESS
[FreeTextEntry1] : Mr. Rivera is a 55 year-old man with a history of neurosarcoidosis and pituitary dysfunction presenting for follow-up of his endocrine issues. I saw him for an initial visit in July 2018 and last in October 2019. 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 Liset Reagan/neurology.\par \par Adrenal insufficiency.\par At his initial visit, he complained of an unintentional 70-pound weight loss starting in October 2014. He had loss of appetite, early satiety, nausea/vomiting with many foods, constipation. He had a an endoscopy/colonoscopy for evaluation of his symptoms per prior notes.\par At his initial visit he also complained of dizziness when rising from supine or seated positions, and had a fainting episode. He complained of occasional headaches, dyspnea on exertion, cold intolerance. \par We started hydrocortisone 10 mg/5 mg daily after cosyntropin stimulation testing at his initial visit, significant for an inappropriate response (less than 18 mcg/dL after cosyntropin) with relatively low ACTH. \par He was not able to take hydrocortisone twice daily and we switched to prednisone 5 mg daily. He is currently taking prednisone 10 mg daily for neurosarcoidosis.\par \par Central hypothyroidism.\par Free T4 was low, with an inappropriately normal TSH, consistent with central hypothyroidism.\par We started levothyroxine 75 mcg daily after starting hydrocortisone.\par He is taking levothyroxine in the morning, on an empty stomach, with plain water, and waiting at least 30 minutes before eating. He is not taking calcium/iron/multivitamin. \par Free T4 has been mid-range on this regimen.\par \par Hypogonadotrophic hypogonadism. \par He complains of symptoms of erectile dysfunction starting around 2016.\par He complained of decreased morning erections and difficulty maintaining erections. He denies change in libido, shaving frequency, muscle weakness.\par He was noted to have undetectable testosterone and low FSH and LH in August 2018.\par He started testosterone 5 gm daily in October 2018 after issues with obtaining authorization through his insurance. He has been trying to extend his prescription by not taking daily. \par \par Diabetes insipidus.\par He noted significantly increase urine output after starting hydrocortisone. The issue was brought to my attention in October 2018. \par We started desmopressin 0.05 (1/2 0.1 mg) pill once daily in October 2018, subsequently twice daily due to incomplete symptom relief. \par He was taking 0.1 mg daily since he was unable to take medications twice daily due to work. He discontinued therapy in February 2020 and has been matching fluid intake with urine output.\par \par Interim History \par He saw Dr. Reagan in June 2020 and prednisone adjusted to 10 mg daily.\par He has been taking levothyroxine 75 mcg daily. \par He has been trying to extend his testosterone prescription by not taking daily. \par He discontinued desmopressin therapy in February 2020 and has been matching fluid intake with urine output.\par He feels well today. Weight is up 6 pounds since last visit. No palpitations, diarrhea/constipation, hair/skin changes, depression/anxiety. \par Medical and surgical history, medications, allergies, social and family history reviewed and updated as needed.

## 2020-11-18 NOTE — PHYSICAL EXAM
[Alert] : alert [Healthy Appearance] : healthy appearance [No Acute Distress] : no acute distress [Normal Sclera/Conjunctiva] : normal sclera/conjunctiva [Visual Fields Grossly Intact] : visual fields grossly intact [No Stigmata of Cushings Syndrome] : no stigmata of Cushings Syndrome [Normal Gait] : normal gait [Normal Insight/Judgement] : insight and judgment were intact [Kyphosis] : no kyphosis present [Acanthosis Nigricans] : no acanthosis nigricans [de-identified] : no moon facies, no supraclavicular fat pads

## 2020-11-18 NOTE — ASSESSMENT
[FreeTextEntry1] : Adrenal insufficiency. Cosyntropin stimulation testing at his initial visit was significant for an inappropriate response with relatively low ACTH. He is taking prednisone 10 mg daily per recommendation from neurology. We reviewed proper use and compliance with prednisone. \par Continue prednisone 10 mg daily; advised he needs to take at least 5 mg daily for the rest of his life\par Patient advised to take 3 times normal dose with illness or stress\par Patient advised to go to the emergency department immediately if he has nausea and cannot take prednisone\par Patient was again advised to obtain a medical alert bracelet\par \par Central hypothyroidism. Free T4 was low, with an inappropriately normal TSH, consistent with central hypothyroidism. We reviewed proper use and compliance with levothyroxine.\par Continue levothyroxine 75 mcg daily pending thyroid function tests today\par Check free T4; TSH is not reliable in central hypothyroidism\par \par Secondary hypogonadism. He was noted to have undetectable testosterone and low FSH and LH. He has been trying to extend his testosterone prescription by not taking daily. He is amenable to stopping testosterone and starting a trial of clomiphene off-label for therapy of hypogonadism. We discussed the risks and benefits, including but not limited to headache and dizziness.\par Check testosterone, blood counts, prostates specific antigen\par Stop testosterone and start clomiphene 50 mg every other day\par \par Diabetes insipidus. He was diagnosed with diabetes insipidus, unmasked by hydrocortisone. He prefers to remain off therapy for now, and is able to balance fluid intake with urine output.\par Check metabolic panel today\par \par Return to clinic in 3 months. Patient advised to call me immediately with any questions or concerns.\par \par Instructions given to patient:\par Low cortisol\par Take prednisone 10 mg (1 pill) every day\par If you get sick, take 3 pills a day and call me and your primary care doctor right away\par \par Low thyroid\par Take levothyroxine 75 mcg (1 pill) every day\par Take it on an empty stomach, with water only, and wait 30 minutes before eating or taking other pills\par \par Low testosterone\par Take clomiphene 50 mg every other day\par \par CC:\par Dr. Karthikeyan Spain, Fax [phone]

## 2020-11-18 NOTE — ADDENDUM
[FreeTextEntry1] : Recent test results as below; discussed with Mr. Rivera. Testosterone is low and plan as above for clomiphene. Hemoglobin borderline low; may improve with increased testosterone level and also recommended follow-up with primary care. Free T4 within range and recommend continuing current levothyroxine regimen. His 10 year risk of heart disease or stroke is 5.7% without clear indication for statin therapy at this time. Vitamin D is low; I advised ergocalciferol 50,000 intl units every two weeks. Other test results within range. 10/12/20\par \par COVID-19 PCR negative; discussed with Mr. Rivera. He is traveling to [place] and [place] for Thanksgiving. 11/18/20\par \par Laboratories (October 7, 2020) reviewed and significant for: \par Hemoglobin/hematocrit 12.6 g/dL/38.6%\par Sodium 139 mmol/L; otherwise unremarkable comprehensive metabolic panel\par Hemoglobin A1c 5.2%\par TSH 0.01 uIU/mL - not reliable\par Free T4 1.2 ng/dL (normal: 0.8-1.8)\par Testosterone 95 ng/dL\par Prostate specific antigen 0.2 ng/mL\par  mg/dL\par HDL 61 mg/dL\par Total cholesterol 251 mg/dL\par Triglycerides 164 mg/dL\par Vitamin B12 435 pg/mL\par 25-hydroxyvitamin D 19 ng/mL

## 2020-11-23 ENCOUNTER — RX RENEWAL (OUTPATIENT)
Age: 55
End: 2020-11-23

## 2020-11-27 ENCOUNTER — TRANSCRIPTION ENCOUNTER (OUTPATIENT)
Age: 55
End: 2020-11-27

## 2020-12-04 ENCOUNTER — APPOINTMENT (OUTPATIENT)
Dept: MRI IMAGING | Facility: CLINIC | Age: 55
End: 2020-12-04
Payer: COMMERCIAL

## 2020-12-04 ENCOUNTER — OUTPATIENT (OUTPATIENT)
Dept: OUTPATIENT SERVICES | Facility: HOSPITAL | Age: 55
LOS: 1 days | End: 2020-12-04
Payer: COMMERCIAL

## 2020-12-04 DIAGNOSIS — Z00.8 ENCOUNTER FOR OTHER GENERAL EXAMINATION: ICD-10-CM

## 2020-12-04 DIAGNOSIS — D86.89 SARCOIDOSIS OF OTHER SITES: ICD-10-CM

## 2020-12-04 PROCEDURE — A9585: CPT

## 2020-12-04 PROCEDURE — 70553 MRI BRAIN STEM W/O & W/DYE: CPT

## 2020-12-04 PROCEDURE — 70553 MRI BRAIN STEM W/O & W/DYE: CPT | Mod: 26

## 2021-01-04 ENCOUNTER — APPOINTMENT (OUTPATIENT)
Dept: ENDOCRINOLOGY | Facility: CLINIC | Age: 56
End: 2021-01-04

## 2021-01-04 ENCOUNTER — APPOINTMENT (OUTPATIENT)
Dept: NEUROLOGY | Facility: CLINIC | Age: 56
End: 2021-01-04
Payer: COMMERCIAL

## 2021-01-04 PROCEDURE — 99215 OFFICE O/P EST HI 40 MIN: CPT | Mod: 95

## 2021-01-04 NOTE — HISTORY OF PRESENT ILLNESS
[Home] : at home, [unfilled] , at the time of the visit. [Medical Office: ([facility])___] : at the medical office located in  [Verbal consent obtained from patient] : the patient, [unfilled] [FreeTextEntry1] : Initial hx 8/2018\par \par Retired in 9/2019\par 2014 - R sided facial numbness (no pain) after wisdom tooth extraction, involving the entire face even up to forehead and scalp. has been stable, persists to date. \par 2015 - Went to see neurologist who gave him pills for neuropathy. after got medication, began to have tingling and numbness in fingers, was told he has CTS. then switched medication which made him have mood alteration so he stopped that, then a 3rd medication began to make him dizzy so he stopped that one as well. \par 2015 - progressively worsening dizziness, SOB, generalized weakness, fatigue. went to [facility], admitted x8d, found anemia.\par 2016 - referred to pulm, found to have sarcoidosis after lymph node biopsy. sent to another neurologist at [facility] (Dr. Luciano ), had brain MRI that was reportedly normal.\par Between 2016 and 2018, was getting PFTs and they were stable. was never on rx for sarcoidosis.\par 6/2018 - developed SOB, went to [facility], found to have anemia. 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 Since on hydrocortisone, feels well.\par MRI of pituitary 8/2018 revealed findings c/w neurosarcoidosis and ?possible meningioma (though I believe this may be a sarcoid nodule) in interhemispheric fissure with surrounding edema. Referred to neurology.\par 9/2018 - had LP showing atypical lymphocytes, wbc 27. had unremarkable CT abd/pelvis 10/2018. He also had CT chest in 6/2018 showing nodules that were thought related to prior sarcoid dx. Never got planned steroids and was lost to f/u X1y until 10/2019.\par \par S: Fully retired, going "beautifully". No new neurological symptoms. Feels well other than chronic intermittent R facial numbness - used to be constant but now not as common. \par \par MEDS:\par synthroid 75mcg\par desmopressin\par prednisone 5mg daily\par testosterone\par \par O:\par AO3. Normally conversant. Follows commands, names, and repeats. Good attention.\par \par HIV negative\par \par MRI brain 12/2020 - somewhat worsened lepto, previously dominant lesion is still smaller than pre-treatment\par \par MRI brain 6/2020 - interhemispheric lesion continues to shrink substantially. however, some small new areas of lepto.\par \par MRI brain 3/2020 - reviewed, lepto has almost resolved, and large interhemispheric lesion is much improved, edema much improved.\par \par MRI brain 12/2019 - report not yet available. on my review, notable for slight growth of frontal interhemispheric lesion with increase in surrounding edema. diffuse leptomeningeal enhancement is similar on my review.\par \par MRI brain 8/2018 - diffuse leptomeningeal enhancement. interhemispheric lesion is read as ?meningioma vs dural met, though I believe it may also be c/w neurosarcoidosis. The dominant interhemispheric lesion may actually be a left frontal cortical lesion, as the edema is more pronounced in the left hemisphere and it is not clearly dural based. [id number] on my review.\par \par MRI brain 2016 - read as enhancing likely-extra axial mass, interhemispheric, likely meningioma but cannot r/o sarcoid. [id number]. read as some edema.\par \par CSF 9/2018\par WBC 27\par PCR neg\par crypto neg\par igg index high\par culture neg\par glucose 36\par prot 126\par fungal neg\par afb neg\par lactate neg\par LDH neg\par hiso neg\par VDRL neg\par west nile igg pos, igm neg\par ACE nl\par cytology with atypical lymphocytes\par \par AP: Neurosarcoidosis. I believe that the endocrinological pathology the pt has had is likely related to the neurosarcoid seen on prior MRI.\par \par Clinically stable, but some worsening radiographically, though midline lesion continued to improve.\par \par Education provided. All questions answered. Management discussed at length. Pt has chronic illness that poses a threat to bodily function. Reviewed MRI personally. Discussed with Dr. Warren.\par \par - increase prednisone to 20mg daily\par - will likely start cellcept 500bid next week (discussed risks/benefits with pt), but will wait until exam/work up of leg lesion.\par - new blood work as new baseline.\par - referral to ophtho for floaters\par - referral to Dr. Warren for sarcoidosis (pt wants to switch rheumatologists)\par - pt to see Dr. Hardin on Friday. I advised him that an in-person visit might be best if possible, at least so an exam of the "mass" on his leg can be performed. Have discussed with Dr. Hardin.\par - RTC 1wk (can be teb or phone) to discuss starting cellcept

## 2021-01-08 ENCOUNTER — APPOINTMENT (OUTPATIENT)
Dept: ENDOCRINOLOGY | Facility: CLINIC | Age: 56
End: 2021-01-08
Payer: COMMERCIAL

## 2021-01-08 VITALS
SYSTOLIC BLOOD PRESSURE: 119 MMHG | DIASTOLIC BLOOD PRESSURE: 81 MMHG | WEIGHT: 195 LBS | BODY MASS INDEX: 29.65 KG/M2 | HEART RATE: 90 BPM

## 2021-01-08 PROCEDURE — 99214 OFFICE O/P EST MOD 30 MIN: CPT

## 2021-01-08 PROCEDURE — 99072 ADDL SUPL MATRL&STAF TM PHE: CPT

## 2021-01-11 ENCOUNTER — TRANSCRIPTION ENCOUNTER (OUTPATIENT)
Age: 56
End: 2021-01-11

## 2021-01-12 ENCOUNTER — TRANSCRIPTION ENCOUNTER (OUTPATIENT)
Age: 56
End: 2021-01-12

## 2021-01-13 ENCOUNTER — TRANSCRIPTION ENCOUNTER (OUTPATIENT)
Age: 56
End: 2021-01-13

## 2021-01-13 ENCOUNTER — APPOINTMENT (OUTPATIENT)
Dept: NEUROLOGY | Facility: CLINIC | Age: 56
End: 2021-01-13
Payer: COMMERCIAL

## 2021-01-13 PROCEDURE — 99213 OFFICE O/P EST LOW 20 MIN: CPT | Mod: 95

## 2021-01-13 NOTE — HISTORY OF PRESENT ILLNESS
[Home] : at home, [unfilled] , at the time of the visit. [Medical Office: ([facility])___] : at the medical office located in  [Verbal consent obtained from patient] : the patient, [unfilled] [FreeTextEntry1] : Initial hx 8/2018\par \par Retired in 9/2019\par 2014 - R sided facial numbness (no pain) after wisdom tooth extraction, involving the entire face even up to forehead and scalp. has been stable, persists to date. \par 2015 - Went to see neurologist who gave him pills for neuropathy. after got medication, began to have tingling and numbness in fingers, was told he has CTS. then switched medication which made him have mood alteration so he stopped that, then a 3rd medication began to make him dizzy so he stopped that one as well. \par 2015 - progressively worsening dizziness, SOB, generalized weakness, fatigue. went to [facility], admitted x8d, found anemia.\par 2016 - referred to pulm, found to have sarcoidosis after lymph node biopsy. sent to another neurologist at [facility] (Dr. Luciano ), had brain MRI that was reportedly normal.\par Between 2016 and 2018, was getting PFTs and they were stable. was never on rx for sarcoidosis.\par 6/2018 - developed SOB, went to [place], found to have anemia. 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 Since on hydrocortisone, feels well.\par MRI of pituitary 8/2018 revealed findings c/w neurosarcoidosis and ?possible meningioma (though I believe this may be a sarcoid nodule) in interhemispheric fissure with surrounding edema. Referred to neurology.\par 9/2018 - had LP showing atypical lymphocytes, wbc 27. had unremarkable CT abd/pelvis 10/2018. He also had CT chest in 6/2018 showing nodules that were thought related to prior sarcoid dx. Never got planned steroids and was lost to f/u X1y until 10/2019.\par \par S: Fully retired, going "beautifully". No new neurological symptoms. Feels well other than chronic intermittent R facial numbness - used to be constant but now not as common. \par \par MEDS:\par synthroid 75mcg\par desmopressin\par prednisone\par testosterone\par \par O:\par AO3. Normally conversant. Follows commands, names, and repeats. Good attention.\par \par HIV negative\par \par MRI brain 12/2020 - somewhat worsened lepto, previously dominant lesion is still smaller than pre-treatment\par \par MRI brain 6/2020 - interhemispheric lesion continues to shrink substantially. however, some small new areas of lepto.\par \par MRI brain 3/2020 - reviewed, lepto has almost resolved, and large interhemispheric lesion is much improved, edema much improved.\par \par MRI brain 12/2019 - report not yet available. on my review, notable for slight growth of frontal interhemispheric lesion with increase in surrounding edema. diffuse leptomeningeal enhancement is similar on my review.\par \par MRI brain 8/2018 - diffuse leptomeningeal enhancement. interhemispheric lesion is read as ?meningioma vs dural met, though I believe it may also be c/w neurosarcoidosis. The dominant interhemispheric lesion may actually be a left frontal cortical lesion, as the edema is more pronounced in the left hemisphere and it is not clearly dural based. [id number] on my review.\par \par MRI brain 2016 - read as enhancing likely-extra axial mass, interhemispheric, likely meningioma but cannot r/o sarcoid. [id number]. read as some edema.\par \par CSF 9/2018\par WBC 27\par PCR neg\par crypto neg\par igg index high\par culture neg\par glucose 36\par prot 126\par fungal neg\par afb neg\par lactate neg\par LDH neg\par hiso neg\par VDRL neg\par west nile igg pos, igm neg\par ACE nl\par cytology with atypical lymphocytes\par \par AP: Neurosarcoidosis. I believe that the endocrinological pathology the pt has had is likely related to the neurosarcoid seen on prior MRI.\par \par Clinically stable, but some worsening radiographically, though midline lesion continued to improve.\par \par Education provided. All questions answered. Management discussed at length. Pt has chronic illness that poses a threat to bodily function. Discussed with Dr. Hardin.\par \par - cont prednisone 20mg daily\par - start cellcept 500bid (discussed risks/benefits with pt)\par - f/u ophtho for floaters\par - f/u with Dr. Hardin\par - RTC 1m

## 2021-01-13 NOTE — ASSESSMENT
[FreeTextEntry1] : Adrenal insufficiency. Cosyntropin stimulation testing at his initial visit was significant for an inappropriate response with relatively low ACTH. He has been taking prednisone 10-20 mg daily per recommendation from neurology. We reviewed proper use and compliance with prednisone. \par Continue prednisone at supraphysiologic dosing as per neurology; advised he needs to take at least 5 mg daily for the rest of his life\par Patient advised to take 3 times normal dose with illness or stress\par Patient advised to go to the emergency department immediately if he has nausea and cannot take prednisone\par Patient was again advised to obtain a medical alert bracelet\par \par Central hypothyroidism. Free T4 was low, with an inappropriately normal TSH, consistent with central hypothyroidism. We reviewed proper use and compliance with levothyroxine.\par Continue levothyroxine 75 mcg daily\par Monitor free T4; **TSH is not reliable in central hypothyroidism**\par \par Secondary hypogonadism. He was noted to have undetectable testosterone and low FSH and LH. He has not been compliant with testosterone replacement. He is amenable to a trial of clomiphene off-label for therapy of hypogonadism pending input from neurology. We discussed the risks and benefits, including but not limited to headache and dizziness.\par Start clomiphene 50 mg every other day pending input from neurology\par \par Diabetes insipidus. He was diagnosed with diabetes insipidus, unmasked by hydrocortisone. He prefers to remain off therapy for now, and is able to balance fluid intake with urine output. Serum sodium has been stable.\par \par Return to clinic in 3 months. Patient advised to call me immediately with any questions or concerns.\par \par CC:\par Dr. Karthikeyan Spain, Fax [phone]

## 2021-01-13 NOTE — DATA REVIEWED
[FreeTextEntry1] : Laboratories (October 7, 2020) reviewed and significant for: \par Hemoglobin/hematocrit 12.6 g/dL/38.6%\par Sodium 139 mmol/L; otherwise unremarkable comprehensive metabolic panel\par Hemoglobin A1c 5.2%\par TSH 0.01 uIU/mL - not reliable\par Free T4 1.2 ng/dL (normal: 0.8-1.8)\par Testosterone 95 ng/dL\par Prostate specific antigen 0.2 ng/mL\par  mg/dL\par HDL 61 mg/dL\par Total cholesterol 251 mg/dL\par Triglycerides 164 mg/dL\par Vitamin B12 435 pg/mL\par 25-hydroxyvitamin D 19 ng/mL

## 2021-01-13 NOTE — HISTORY OF PRESENT ILLNESS
[FreeTextEntry1] : Mr. Rivera is a 55 year-old man with a history of neurosarcoidosis and pituitary dysfunction presenting for follow-up of his endocrine issues. I saw him for an initial visit in July 2018 and last in October 2020. 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 Liset Reagan/neurology.\par \par Adrenal insufficiency.\par At his initial visit, he had a history of an unintentional 70-pound weight loss starting in October 2014. He had loss of appetite, early satiety, nausea/vomiting with many foods, constipation. He had a an endoscopy/colonoscopy for evaluation of his symptoms per prior notes.\par At his initial visit he also had dizziness when rising from supine or seated positions, including an episode of syncope. He also had occasional headaches, dyspnea on exertion, cold intolerance. \par We started hydrocortisone 10 mg/5 mg daily after cosyntropin stimulation testing at his initial visit, significant for an inappropriate response (less than 18 mcg/dL after cosyntropin) with relatively low ACTH. \par He was not able to take hydrocortisone twice daily and we switched to prednisone 5 mg daily. He has been taking prednisone 10-20 mg daily for neurosarcoidosis.\par \par Central hypothyroidism.\par Free T4 was low, with an inappropriately normal TSH, consistent with central hypothyroidism.\par We started levothyroxine 75 mcg daily after starting hydrocortisone.\par He is taking levothyroxine in the morning, on an empty stomach, with plain water, and waiting at least 30 minutes before eating. He is not taking calcium/iron/multivitamin. \par Free T4 has been mid-range on this regimen.\par \par Hypogonadotrophic hypogonadism. \par He had symptoms of erectile dysfunction starting around 2016, including decreased frequency of morning erections and difficulty maintaining erections. No change in libido, shaving frequency, muscle weakness.\par He was noted to have undetectable testosterone and low FSH and LH in August 2018.\par He started testosterone 5 gm daily in October 2018 after issues with obtaining authorization through his insurance. He has not been compliant with testosterone replacement, and we discussed switching to clomiphene in October 2020.\par \par Diabetes insipidus.\par He noted significantly increase urine output after starting hydrocortisone. The issue was brought to my attention in October 2018. \par We started desmopressin 0.05 (1/2 0.1 mg) pill once daily in October 2018, subsequently twice daily due to incomplete symptom relief. \par He was taking 0.1 mg daily since he was unable to take medications twice daily due to work. He discontinued therapy in February 2020 and has been matching fluid intake with urine output.\par \par Interim History \par He saw Dr. Liset Reagan in December 2020 and January 2021. His imaging abnormalities have progressed, with plan to adjust prednisone to 20 mg daily and start mycophenolate. \par Laboratory results from last visit as below. Testosterone low and planned for clomiphene. Hemoglobin borderline low; may improve with increased testosterone level and also recommended follow-up with primary care. Free T4 within range and recommended continuing current levothyroxine regimen. His 10 year risk of heart disease or stroke is 5.7% without clear indication for statin therapy at this time. Vitamin D low; I advised ergocalciferol 50,000 intl units every two weeks. Other test results within range. \par He has not yet started clomiphene; he states he did not receive from his pharmacy and believes that Dr. Reagan may have advised against therapy for now. \par He travelled over the Thanksgiving holidays. His brother passed away before Christmas. His wedding anniversary is Yadira Day.\par He had a lesion on his leg that he scratched and now feels better. He feels well today. Weight is up 2 pounds since last visit. No palpitations, diarrhea/constipation, hair/skin changes, depression/anxiety. \par Medical and surgical history, medications, allergies, social and family history reviewed and updated as needed.

## 2021-01-13 NOTE — ADDENDUM
[FreeTextEntry1] : Clomiphene is acceptable to Dr. Reagan; I left a message for Mr. Rivera to discuss. 1/08/21\par \par Clomiphene not covered by insurance. I left a message for Mr. Rivera to discuss; clomiphene is available through other pharmacies for as low as $21 per month. 1/13/21

## 2021-01-13 NOTE — PHYSICAL EXAM
[Alert] : alert [Healthy Appearance] : healthy appearance [No Acute Distress] : no acute distress [Normal Sclera/Conjunctiva] : normal sclera/conjunctiva [Visual Fields Grossly Intact] : visual fields grossly intact [No Stigmata of Cushings Syndrome] : no stigmata of Cushings Syndrome [Normal Gait] : normal gait [Normal Insight/Judgement] : insight and judgment were intact [Kyphosis] : no kyphosis present [Acanthosis Nigricans] : no acanthosis nigricans [de-identified] : no moon facies, no supraclavicular fat pads [de-identified] : 0.5 cm area on left other thigh with clean base, no erythema/discharge/swelling/pain

## 2021-01-27 ENCOUNTER — TRANSCRIPTION ENCOUNTER (OUTPATIENT)
Age: 56
End: 2021-01-27

## 2021-02-17 ENCOUNTER — APPOINTMENT (OUTPATIENT)
Dept: NEUROLOGY | Facility: CLINIC | Age: 56
End: 2021-02-17

## 2021-04-12 ENCOUNTER — TRANSCRIPTION ENCOUNTER (OUTPATIENT)
Age: 56
End: 2021-04-12

## 2021-04-14 ENCOUNTER — LABORATORY RESULT (OUTPATIENT)
Age: 56
End: 2021-04-14

## 2021-04-14 ENCOUNTER — APPOINTMENT (OUTPATIENT)
Dept: ENDOCRINOLOGY | Facility: CLINIC | Age: 56
End: 2021-04-14
Payer: COMMERCIAL

## 2021-04-14 VITALS
HEART RATE: 83 BPM | HEIGHT: 68 IN | SYSTOLIC BLOOD PRESSURE: 120 MMHG | WEIGHT: 216 LBS | BODY MASS INDEX: 32.74 KG/M2 | DIASTOLIC BLOOD PRESSURE: 75 MMHG

## 2021-04-14 DIAGNOSIS — Z13.820 ENCOUNTER FOR SCREENING FOR OSTEOPOROSIS: ICD-10-CM

## 2021-04-14 LAB — GLUCOSE BLDC GLUCOMTR-MCNC: 101

## 2021-04-14 PROCEDURE — 99214 OFFICE O/P EST MOD 30 MIN: CPT | Mod: 25

## 2021-04-14 PROCEDURE — 99072 ADDL SUPL MATRL&STAF TM PHE: CPT

## 2021-04-14 PROCEDURE — 82962 GLUCOSE BLOOD TEST: CPT

## 2021-04-15 LAB
25(OH)D3 SERPL-MCNC: 19.7 NG/ML
ALBUMIN SERPL ELPH-MCNC: 4.3 G/DL
ALP BLD-CCNC: 51 U/L
ALT SERPL-CCNC: 22 U/L
ANION GAP SERPL CALC-SCNC: 13 MMOL/L
AST SERPL-CCNC: 23 U/L
BASOPHILS # BLD AUTO: 0 K/UL
BASOPHILS NFR BLD AUTO: 0 %
BILIRUB SERPL-MCNC: 0.3 MG/DL
BUN SERPL-MCNC: 13 MG/DL
CALCIUM SERPL-MCNC: 8.9 MG/DL
CHLORIDE SERPL-SCNC: 103 MMOL/L
CO2 SERPL-SCNC: 26 MMOL/L
CREAT SERPL-MCNC: 1.22 MG/DL
EOSINOPHIL # BLD AUTO: 0.19 K/UL
EOSINOPHIL NFR BLD AUTO: 1.7 %
GLUCOSE SERPL-MCNC: 95 MG/DL
HCT VFR BLD CALC: 38.7 %
HGB BLD-MCNC: 12.2 G/DL
LYMPHOCYTES # BLD AUTO: 1.16 K/UL
LYMPHOCYTES NFR BLD AUTO: 10.5 %
MAN DIFF?: NORMAL
MCHC RBC-ENTMCNC: 27.5 PG
MCHC RBC-ENTMCNC: 31.5 GM/DL
MCV RBC AUTO: 87.2 FL
MONOCYTES # BLD AUTO: 0.59 K/UL
MONOCYTES NFR BLD AUTO: 5.3 %
NEUTROPHILS # BLD AUTO: 9.01 K/UL
NEUTROPHILS NFR BLD AUTO: 81.6 %
PLATELET # BLD AUTO: 277 K/UL
POTASSIUM SERPL-SCNC: 4.4 MMOL/L
PROT SERPL-MCNC: 6.6 G/DL
PSA SERPL-MCNC: 0.03 NG/ML
RBC # BLD: 4.44 M/UL
RBC # FLD: 13.3 %
SODIUM SERPL-SCNC: 143 MMOL/L
T4 FREE SERPL-MCNC: 1.3 NG/DL
TESTOST SERPL-MCNC: <2.5 NG/DL
TSH SERPL-ACNC: 0.01 UIU/ML
WBC # FLD AUTO: 11.04 K/UL

## 2021-04-15 RX ORDER — CLOMIPHENE CITRATE 50 MG/1
50 TABLET ORAL
Qty: 45 | Refills: 1 | Status: DISCONTINUED | COMMUNITY
Start: 2020-10-06 | End: 2021-04-15

## 2021-04-23 RX ORDER — TESTOSTERONE ENANTHATE 75 MG/.5ML
75 INJECTION SUBCUTANEOUS WEEKLY
Qty: 1 | Refills: 0 | Status: DISCONTINUED | COMMUNITY
Start: 2021-04-15 | End: 2021-04-23

## 2021-05-10 ENCOUNTER — APPOINTMENT (OUTPATIENT)
Dept: RADIOLOGY | Facility: CLINIC | Age: 56
End: 2021-05-10
Payer: COMMERCIAL

## 2021-05-10 ENCOUNTER — OUTPATIENT (OUTPATIENT)
Dept: OUTPATIENT SERVICES | Facility: HOSPITAL | Age: 56
LOS: 1 days | End: 2021-05-10
Payer: COMMERCIAL

## 2021-05-10 DIAGNOSIS — Z00.8 ENCOUNTER FOR OTHER GENERAL EXAMINATION: ICD-10-CM

## 2021-05-10 PROCEDURE — 77080 DXA BONE DENSITY AXIAL: CPT | Mod: 26

## 2021-05-10 PROCEDURE — 77080 DXA BONE DENSITY AXIAL: CPT

## 2021-05-11 NOTE — ADDENDUM
[FreeTextEntry1] : Recent laboratory results as below. White blood cell count borderline elevated with positive metamyelocytes. I referred him to hematology providers within the [facility] system. Free T4 within range. Testosterone undetectable. We will need to resume testosterone replacement; we will investigate if Xyosted is covered by insurance. Vitamin D low and recommended ergocalciferol 50,000 intl units every two weeks. Other test results within range. 4/15/21\par \par Xyosted is a plan exclusion. I discussed options for testosterone replacement with Mr. Rivera. He is most amenable to another trial of testosterone gel; encouraged compliance. Prescription sent to pharmacy. 4/23/21\par \par Recent bone density within range; discussed with Mr. Rivera. 5/11/21

## 2021-05-11 NOTE — PHYSICAL EXAM
[Alert] : alert [Healthy Appearance] : healthy appearance [No Acute Distress] : no acute distress [Normal Sclera/Conjunctiva] : normal sclera/conjunctiva [Visual Fields Grossly Intact] : visual fields grossly intact [No Stigmata of Cushings Syndrome] : no stigmata of Cushings Syndrome [Normal Gait] : normal gait [Normal Insight/Judgement] : insight and judgment were intact [Kyphosis] : no kyphosis present [Acanthosis Nigricans] : no acanthosis nigricans [de-identified] : no moon facies, no supraclavicular fat pads [de-identified] : 0.5 cm area on left other thigh with clean base, no erythema/discharge/swelling/pain

## 2021-05-11 NOTE — ASSESSMENT
[FreeTextEntry1] : Adrenal insufficiency. Cosyntropin stimulation testing at his initial visit was significant for an inappropriate response with relatively low ACTH. He has been taking prednisone 10-20 mg daily per recommendation from neurology. We reviewed proper use and compliance with prednisone. \par Continue prednisone at supraphysiologic dosing as per neurology; advised he needs to take at least 5 mg daily for the rest of his life\par Patient advised to take 3 times normal dose with illness or stress\par Patient advised to go to the emergency department immediately if he has nausea and cannot take prednisone\par Patient was again advised to obtain a medical alert bracelet\par \par Central hypothyroidism. Free T4 was low, with an inappropriately normal TSH, consistent with central hypothyroidism. We reviewed proper use and compliance with levothyroxine.\par Continue levothyroxine 75 mcg daily\par Monitor free T4; **TSH is not reliable in central hypothyroidism**\par \par Secondary hypogonadism. He was noted to have undetectable testosterone and low FSH and LH. He has not been compliant with testosterone replacement. He is amenable to a trial of clomiphene off-label for therapy of hypogonadism pending input from neurology. We discussed the risks and benefits, including but not limited to headache and dizziness.\par Continue clomiphene 50 mg every other day pending testosterone\par \par Diabetes insipidus. He was diagnosed with diabetes insipidus, unmasked by hydrocortisone. He prefers to remain off therapy for now, and is able to balance fluid intake with urine output. Serum sodium has been stable.\par \par Bone health. Obtain baseline dual energy X-ray absorptiometry due to prednisone use. \par \par Weight gain. We discussed lifestyle modifications for weight loss. He declines referral to nutrition at this time.\par \par Return to clinic in 3 months. Patient advised to call me immediately with any questions or concerns.\par \par CC:\par Dr. Karthikeyan Spain, Fax [phone]

## 2021-05-11 NOTE — HISTORY OF PRESENT ILLNESS
[FreeTextEntry1] : Mr. Rivera is a 55 year-old man with a history of neurosarcoidosis and pituitary dysfunction presenting for follow-up of his endocrine issues. I saw him for an initial visit in July 2018 and last in January 2021. 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 Liset Reagan/neurology.\par \par Adrenal insufficiency.\par At his initial visit, he had a history of an unintentional 70-pound weight loss starting in October 2014. He had loss of appetite, early satiety, nausea/vomiting with many foods, constipation. He had a an endoscopy/colonoscopy for evaluation of his symptoms per prior notes.\par At his initial visit he also had dizziness when rising from supine or seated positions, including an episode of syncope. He also had occasional headaches, dyspnea on exertion, cold intolerance. \par We started hydrocortisone 10 mg/5 mg daily after cosyntropin stimulation testing at his initial visit, significant for an inappropriate response (less than 18 mcg/dL after cosyntropin) with relatively low ACTH. \par He was not able to take hydrocortisone twice daily and we switched to prednisone 5 mg daily. He has subsequently been taking prednisone 10-20 mg daily for neurosarcoidosis.\par \par Central hypothyroidism.\par Free T4 was low, with an inappropriately normal TSH, consistent with central hypothyroidism.\par We started levothyroxine 75 mcg daily after starting hydrocortisone.\par He is taking levothyroxine in the morning, on an empty stomach, with plain water, and waiting at least 30 minutes before eating. He is not taking calcium/iron/multivitamin. \par Free T4 has been mid-range on this regimen.\par \par Hypogonadotrophic hypogonadism. \par He had symptoms of erectile dysfunction starting around 2016, including decreased frequency of morning erections and difficulty maintaining erections. No change in libido, shaving frequency, muscle weakness.\par He was noted to have undetectable testosterone and low FSH and LH in August 2018.\par He started testosterone 5 gm daily in October 2018 after issues with obtaining authorization through his insurance. He has not been compliant with testosterone replacement, and we discussed switching to clomiphene in October 2020. We started clomiphene 50 mg every other day in January 2021.\par \par Diabetes insipidus.\par He noted significantly increase urine output after starting hydrocortisone. The issue was brought to my attention in October 2018. \par We started desmopressin 0.05 (1/2 0.1 mg) pill once daily in October 2018, subsequently twice daily due to incomplete symptom relief. \par He was taking 0.1 mg daily since he was unable to take medications twice daily due to work. He discontinued therapy in February 2020 and has been matching fluid intake with urine output.\par \par Interim History \par He has been off clomiphene for about a week due to lapsed prescription. \par He is currently taking prednisone 10 mg daily and mycophenolate for neurosarcoidosis.\par He received his first dose of the Pfizer COVID-19 vaccine.\par He has been in contact with his father. He will be traveling to [place] to visit.\par He feels well today. Weight is up 21 pounds since last visit. No palpitations, diarrhea/constipation, hair/skin changes, depression/anxiety. \par Medical and surgical history, medications, allergies, social and family history reviewed and updated as needed.

## 2021-07-14 ENCOUNTER — APPOINTMENT (OUTPATIENT)
Dept: ENDOCRINOLOGY | Facility: CLINIC | Age: 56
End: 2021-07-14
Payer: COMMERCIAL

## 2021-07-14 VITALS
WEIGHT: 220 LBS | SYSTOLIC BLOOD PRESSURE: 133 MMHG | HEART RATE: 80 BPM | DIASTOLIC BLOOD PRESSURE: 85 MMHG | BODY MASS INDEX: 33.45 KG/M2

## 2021-07-14 LAB
GLUCOSE BLDC GLUCOMTR-MCNC: 100
HBA1C MFR BLD HPLC: 5.6

## 2021-07-14 PROCEDURE — 83036 HEMOGLOBIN GLYCOSYLATED A1C: CPT | Mod: QW

## 2021-07-14 PROCEDURE — 99072 ADDL SUPL MATRL&STAF TM PHE: CPT

## 2021-07-14 PROCEDURE — 99214 OFFICE O/P EST MOD 30 MIN: CPT | Mod: 25

## 2021-07-14 PROCEDURE — 82962 GLUCOSE BLOOD TEST: CPT

## 2021-07-14 RX ORDER — MYCOPHENOLATE MOFETIL 500 MG/1
500 TABLET ORAL TWICE DAILY
Qty: 60 | Refills: 3 | Status: DISCONTINUED | COMMUNITY
Start: 2021-01-27 | End: 2021-07-14

## 2021-07-14 NOTE — PHYSICAL EXAM
[Alert] : alert [Healthy Appearance] : healthy appearance [No Acute Distress] : no acute distress [Normal Sclera/Conjunctiva] : normal sclera/conjunctiva [Visual Fields Grossly Intact] : visual fields grossly intact [No Stigmata of Cushings Syndrome] : no stigmata of Cushings Syndrome [Normal Gait] : normal gait [Normal Insight/Judgement] : insight and judgment were intact [Kyphosis] : no kyphosis present [Acanthosis Nigricans] : no acanthosis nigricans [de-identified] : no moon facies, no supraclavicular fat pads [de-identified] : 0.5 cm area on left other thigh with clean base, no erythema/discharge/swelling/pain

## 2021-07-14 NOTE — ASSESSMENT
[FreeTextEntry1] : Adrenal insufficiency. Cosyntropin stimulation testing at his initial visit was significant for an inappropriate response with relatively low ACTH. He has been taking prednisone 10-20 mg daily per recommendation from neurology. We reviewed proper use and compliance with prednisone. \par Continue prednisone at supraphysiologic dosing as per neurology; advised he needs to take at least 5 mg daily for the rest of his life\par Patient advised to take 3 times normal dose with illness or stress\par Patient advised to go to the emergency department immediately if he has nausea and cannot take prednisone\par Patient was again advised to obtain a medical alert bracelet\par \par Central hypothyroidism. Free T4 was low, with an inappropriately normal TSH, consistent with central hypothyroidism. We reviewed proper use and compliance with levothyroxine.\par Continue levothyroxine 75 mcg daily\par Monitor free T4; **TSH is not reliable in central hypothyroidism**\par \par Secondary hypogonadism. He was noted to have undetectable testosterone and low FSH and LH. He has not been compliant with testosterone replacement. Clomiphene failed to raise testosterone. \par Restart testosterone 5 gm gel; will investigate if prior authorization needed\par \par Diabetes insipidus. He was diagnosed with diabetes insipidus, unmasked by hydrocortisone. He prefers to remain off therapy for now, and is able to balance fluid intake with urine output. Serum sodium has been stable.\par \par Weight gain. Point-of-care HbA1c 5.6% today. We discussed lifestyle modifications for weight loss. He declines referral to nutrition at this time.\par \par Bone health/vitamin D deficiency. Bone density within range. Continue ergocalciferol 50,000 intl units daily. \par \par Neurosarcoidosis. He has been taking prednisone 10 mg daily but has been off mycophenolate; I advised him to contact Dr. Liset Reagan as soon as possible. \par \par Positive metamyelocytes. I again referred him to hematology providers within the [facility] system.\par \par Return to clinic in 3 months. Patient advised to call me immediately with any questions or concerns.\par \par CC:\par Dr. Karthikeyan Spain, Fax [phone]

## 2021-07-14 NOTE — HISTORY OF PRESENT ILLNESS
[FreeTextEntry1] : Mr. Rivera is a 55 year-old man with a history of neurosarcoidosis and pituitary dysfunction presenting for follow-up of his endocrine issues. I saw him for an initial visit in July 2018 and last in April 2021. 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 Liset Reagan/neurology.\par \par Adrenal insufficiency.\par At his initial visit, he had a history of an unintentional 70-pound weight loss starting in October 2014. He had loss of appetite, early satiety, nausea/vomiting with many foods, constipation. He had a an endoscopy/colonoscopy for evaluation of his symptoms per prior notes.\par At his initial visit he also had dizziness when rising from supine or seated positions, including an episode of syncope. He also had occasional headaches, dyspnea on exertion, cold intolerance. \par We started hydrocortisone 10 mg/5 mg daily after cosyntropin stimulation testing at his initial visit, significant for an inappropriate response (less than 18 mcg/dL after cosyntropin) with relatively low ACTH. \par He was not able to take hydrocortisone twice daily and we switched to prednisone 5 mg daily. He has subsequently been taking prednisone 10-20 mg daily for neurosarcoidosis.\par \par Central hypothyroidism.\par Free T4 was low, with an inappropriately normal TSH, consistent with central hypothyroidism.\par We started levothyroxine 75 mcg daily after starting hydrocortisone.\par He is taking levothyroxine in the morning, on an empty stomach, with plain water, and waiting at least 30 minutes before eating. He is not taking calcium/iron/multivitamin. \par Free T4 has been mid-range on this regimen.\par \par Hypogonadotrophic hypogonadism. \par He had symptoms of erectile dysfunction starting around 2016, including decreased frequency of morning erections and difficulty maintaining erections. No change in libido, shaving frequency, muscle weakness.\par He was noted to have undetectable testosterone and low FSH and LH in August 2018.\par He started testosterone 5 gm daily in October 2018 after issues with obtaining authorization through his insurance. He has not been compliant with testosterone replacement. Clomiphene failed to raise testosterone and we discussed restarting testosterone gel. \par \par Diabetes insipidus.\par He noted significantly increase urine output after starting hydrocortisone. The issue was brought to my attention in October 2018. \par We started desmopressin 0.05 (1/2 0.1 mg) pill once daily in October 2018, subsequently twice daily due to incomplete symptom relief. \par He was taking 0.1 mg daily since he was unable to take medications twice daily due to work. He discontinued therapy in February 2020 and has been matching fluid intake with urine output.\par \par Interim History \par Laboratory results from last visit as below. White blood cell count borderline elevated with positive metamyelocytes. I referred him to hematology providers within the [facility]. Free T4 within range. Testosterone undetectable. We discussed resuming testosterone replacement. Xyosted not covered by insurance and discussed restarting the gel, however, he has not received from the pharmacy. Vitamin D low and recommended ergocalciferol 50,000 intl units every two weeks. Other test results within range.\par Bone density within range.\par He is currently taking prednisone 10 mg daily; he was also taking mycophenolate for neurosarcoidosis, however, he has been off, possibly due to a lapsed prescription.\par He has been travelling for work. He will be travelling to [place] to see his father for his birthday.\par He feels well today. Weight is up 4 pounds since last visit. No palpitations, diarrhea/constipation, hair/skin changes, depression/anxiety. \par Medical and surgical history, medications, allergies, social and family history reviewed and updated as needed.

## 2021-08-20 ENCOUNTER — TRANSCRIPTION ENCOUNTER (OUTPATIENT)
Age: 56
End: 2021-08-20

## 2021-10-20 ENCOUNTER — APPOINTMENT (OUTPATIENT)
Dept: ENDOCRINOLOGY | Facility: CLINIC | Age: 56
End: 2021-10-20
Payer: COMMERCIAL

## 2021-10-20 VITALS
BODY MASS INDEX: 33.45 KG/M2 | WEIGHT: 220 LBS | DIASTOLIC BLOOD PRESSURE: 82 MMHG | SYSTOLIC BLOOD PRESSURE: 132 MMHG | HEART RATE: 77 BPM

## 2021-10-20 LAB
GLUCOSE BLDC GLUCOMTR-MCNC: 102
HBA1C MFR BLD HPLC: 5.2

## 2021-10-20 PROCEDURE — 99214 OFFICE O/P EST MOD 30 MIN: CPT

## 2021-10-21 LAB
25(OH)D3 SERPL-MCNC: 27.6 NG/ML
ALBUMIN SERPL ELPH-MCNC: 4.6 G/DL
ALP BLD-CCNC: 70 U/L
ALT SERPL-CCNC: 35 U/L
ANION GAP SERPL CALC-SCNC: 12 MMOL/L
AST SERPL-CCNC: 38 U/L
BASOPHILS # BLD AUTO: 0.06 K/UL
BASOPHILS NFR BLD AUTO: 1 %
BILIRUB SERPL-MCNC: 0.3 MG/DL
BUN SERPL-MCNC: 9 MG/DL
CALCIUM SERPL-MCNC: 9.6 MG/DL
CHLORIDE SERPL-SCNC: 103 MMOL/L
CHOLEST SERPL-MCNC: 287 MG/DL
CO2 SERPL-SCNC: 26 MMOL/L
CREAT SERPL-MCNC: 1.26 MG/DL
EOSINOPHIL # BLD AUTO: 0.12 K/UL
EOSINOPHIL NFR BLD AUTO: 2.1 %
GLUCOSE SERPL-MCNC: 97 MG/DL
HCT VFR BLD CALC: 43.4 %
HDLC SERPL-MCNC: 51 MG/DL
HGB BLD-MCNC: 13.8 G/DL
IMM GRANULOCYTES NFR BLD AUTO: 2.4 %
LDLC SERPL CALC-MCNC: 194 MG/DL
LYMPHOCYTES # BLD AUTO: 0.74 K/UL
LYMPHOCYTES NFR BLD AUTO: 12.9 %
MAN DIFF?: NORMAL
MCHC RBC-ENTMCNC: 27.7 PG
MCHC RBC-ENTMCNC: 31.8 GM/DL
MCV RBC AUTO: 87 FL
MONOCYTES # BLD AUTO: 0.6 K/UL
MONOCYTES NFR BLD AUTO: 10.4 %
NEUTROPHILS # BLD AUTO: 4.09 K/UL
NEUTROPHILS NFR BLD AUTO: 71.2 %
NONHDLC SERPL-MCNC: 236 MG/DL
PLATELET # BLD AUTO: 281 K/UL
POTASSIUM SERPL-SCNC: 4.3 MMOL/L
PROT SERPL-MCNC: 7.6 G/DL
PSA SERPL-MCNC: 0.16 NG/ML
RBC # BLD: 4.99 M/UL
RBC # FLD: 13.5 %
SODIUM SERPL-SCNC: 141 MMOL/L
T4 FREE SERPL-MCNC: 1.1 NG/DL
TESTOST SERPL-MCNC: 23.4 NG/DL
TRIGL SERPL-MCNC: 213 MG/DL
TSH SERPL-ACNC: 0.01 UIU/ML
VIT B12 SERPL-MCNC: 529 PG/ML
WBC # FLD AUTO: 5.75 K/UL

## 2021-10-21 NOTE — ADDENDUM
[FreeTextEntry1] : Recent laboratory results as below; discussed with Mr. Rivera. Free T4 within range on current regimen of levothyroxine and recommended continuing 75 mcg daily. Testosterone remains very low; he prefers a trial of a higher dose of testosterone gel prior to consideration of intramuscular injections and we will adjust his dose to 100 mg daily. LDL >190 mg/dL and we discussed the risks and benefits of statin therapy, including but not limited to arthralgias and myalgias. He is amenable to a trial of atorvastatin 40 mg daily. Other test results within range. 10/21/21

## 2021-10-21 NOTE — ASSESSMENT
[FreeTextEntry1] : Adrenal insufficiency. Cosyntropin stimulation testing at his initial visit was significant for an inappropriate response with relatively low ACTH. He had been taking prednisone 10-20 mg daily per recommendation from neurology, currently 10 mg daily. We reviewed proper use and compliance with prednisone. \par Continue prednisone at supraphysiologic dosing as per neurology; advised he needs to take at least 5 mg daily for the rest of his life\par Patient advised to take 3 times normal dose with illness or stress\par Patient advised to go to the emergency department immediately if he has nausea and cannot take prednisone\par Patient was again advised to obtain a medical alert bracelet\par \par Secondary hypogonadism. He was noted to have undetectable testosterone and low FSH and LH. He has not been compliant with testosterone replacement. Clomiphene failed to raise testosterone. \par Continue testosterone 5 gm gel pending testosterone level\par \par Central hypothyroidism. Free T4 was low, with an inappropriately normal TSH, consistent with central hypothyroidism. We reviewed proper use and compliance with levothyroxine.\par Continue levothyroxine 75 mcg daily\par Monitor free T4; **TSH is not reliable in central hypothyroidism**\par \par Diabetes insipidus. He was diagnosed with diabetes insipidus, unmasked by hydrocortisone. He prefers to remain off therapy for now, and is able to balance fluid intake with urine output. Serum sodium has been stable.\par \par Weight gain. Point-of-care HbA1c 5.2% today. Laboratory evaluation as below. We discussed lifestyle modifications for weight loss. He declines referral to nutrition at this time.\par \par Bone health/vitamin D deficiency. Bone density within range. Continue ergocalciferol 50,000 intl units every other week pending level. \par \par Neurosarcoidosis. He has been taking prednisone 10 mg daily but has been off mycophenolate; I again advised him to contact Dr. Liset Reagan as soon as possible. \par \par Positive metamyelocytes. I again referred him to hematology providers within the [facility] system.\par \par Return to clinic in 3 months. Patient advised to call me immediately with any questions or concerns.\par \par CC:\par Dr. Karthikeyan Spain, Fax [phone]

## 2021-10-21 NOTE — PHYSICAL EXAM
[Alert] : alert [Healthy Appearance] : healthy appearance [No Acute Distress] : no acute distress [Normal Sclera/Conjunctiva] : normal sclera/conjunctiva [Visual Fields Grossly Intact] : visual fields grossly intact [No Stigmata of Cushings Syndrome] : no stigmata of Cushings Syndrome [Normal Gait] : normal gait [Normal Insight/Judgement] : insight and judgment were intact [Normal Hearing] : hearing was normal [No Respiratory Distress] : no respiratory distress [Kyphosis] : no kyphosis present [Acanthosis Nigricans] : no acanthosis nigricans [de-identified] : no moon facies, no supraclavicular fat pads

## 2021-10-21 NOTE — HISTORY OF PRESENT ILLNESS
[FreeTextEntry1] : Mr. Rivera is a 56 year-old man with a history of neurosarcoidosis and pituitary dysfunction presenting for follow-up of his endocrine issues. I saw him for an initial visit in July 2018 and last in July 2021. 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 Liset Reagan/neurology.\par \par Adrenal insufficiency.\par At his initial visit, he had a history of an unintentional 70-pound weight loss starting in October 2014. He had loss of appetite, early satiety, nausea/vomiting with many foods, constipation. He had a an endoscopy/colonoscopy for evaluation of his symptoms per prior notes.\par At his initial visit he also had dizziness when rising from supine or seated positions, including an episode of syncope. He also had occasional headaches, dyspnea on exertion, cold intolerance. \par We started hydrocortisone 10 mg/5 mg daily after cosyntropin stimulation testing at his initial visit, significant for an inappropriate response (less than 18 mcg/dL after cosyntropin) with relatively low ACTH. \par He was not able to take hydrocortisone twice daily and we switched to prednisone 5 mg daily. He had subsequently been taking prednisone 10-20 mg daily for neurosarcoidosis, currently 10 mg daily.\par \par Central hypothyroidism.\par Free T4 was low, with an inappropriately normal TSH, consistent with central hypothyroidism.\par We started levothyroxine 75 mcg daily after starting hydrocortisone.\par He is taking levothyroxine in the morning, on an empty stomach, with plain water, and waiting at least 30 minutes before eating. He is not taking calcium/iron/multivitamin. \par Free T4 has been mid-range on this regimen.\par \par Hypogonadotrophic hypogonadism. \par He had symptoms of erectile dysfunction starting around 2016, including decreased frequency of morning erections and difficulty maintaining erections. No change in libido, shaving frequency, muscle weakness.\par He was noted to have undetectable testosterone and low FSH and LH in August 2018.\par He started testosterone 5 gm daily in October 2018 after issues with obtaining authorization through his insurance. He has not been compliant with testosterone replacement. Clomiphene failed to raise testosterone and we discussed restarting testosterone gel and strongly encouraged compliance.\par \par Diabetes insipidus.\par He noted significantly increase urine output after starting hydrocortisone. The issue was brought to my attention in October 2018. \par We started desmopressin 0.05 (1/2 0.1 mg) pill once daily in October 2018, subsequently twice daily due to incomplete symptom relief. \par He was taking 0.1 mg daily since he was unable to take medications twice daily due to work. He discontinued therapy in February 2020 and has been matching fluid intake with urine output.\par \par Interim History \par He has been taking testosterone gel regularly since last visit per his report. He feels no difference on testosterone therapy.\par He is currently taking prednisone 10 mg daily; he was also taking mycophenolate for neurosarcoidosis, however, he has been off, possibly due to a lapsed prescription.\par He has been trying to maintain portion control and snacking less.\par He is retired. He travelled to [place] to see his father for his birthday.\par He feels well today. Weight is stable since last visit. No palpitations, diarrhea/constipation, hair/skin changes, depression/anxiety. \par Medical and surgical history, medications, allergies, social and family history reviewed and updated as needed.

## 2021-11-09 NOTE — ED ADULT NURSE REASSESSMENT NOTE - NEURO ASSESSMENT
I have reviewed the H&P, I have examined the patient, and the changes to the patient's condition are as follows: None   
WDL
WDL

## 2021-11-15 ENCOUNTER — RX RENEWAL (OUTPATIENT)
Age: 56
End: 2021-11-15

## 2021-12-06 ENCOUNTER — TRANSCRIPTION ENCOUNTER (OUTPATIENT)
Age: 56
End: 2021-12-06

## 2021-12-23 ENCOUNTER — APPOINTMENT (OUTPATIENT)
Dept: NEUROLOGY | Facility: CLINIC | Age: 56
End: 2021-12-23

## 2022-01-19 ENCOUNTER — APPOINTMENT (OUTPATIENT)
Dept: NEUROLOGY | Facility: CLINIC | Age: 57
End: 2022-01-19
Payer: COMMERCIAL

## 2022-01-19 PROCEDURE — 99214 OFFICE O/P EST MOD 30 MIN: CPT | Mod: 95

## 2022-01-19 NOTE — HISTORY OF PRESENT ILLNESS
[Home] : at home, [unfilled] , at the time of the visit. [Medical Office: ([facility])___] : at the medical office located in  [Verbal consent obtained from patient] : the patient, [unfilled] [FreeTextEntry1] : Initial hx 8/2018\par Retired in 9/2019\par 2014 - R sided facial numbness (no pain) after wisdom tooth extraction, involving the entire face even up to forehead and scalp. has been stable, persists to date. \par 2015 - Went to see neurologist who gave him pills for neuropathy. after got medication, began to have tingling and numbness in fingers, was told he has CTS. then switched medication which made him have mood alteration so he stopped that, then a 3rd medication began to make him dizzy so he stopped that one as well. \par 2015 - progressively worsening dizziness, SOB, generalized weakness, fatigue. went to [facility], admitted x8d, found anemia.\par 2016 - referred to pulm, found to have sarcoidosis after lymph node biopsy. sent to another neurologist at [facility] (Dr. Luciano ), had brain MRI that was reportedly normal.\par Between 2016 and 2018, was getting PFTs and they were stable. was never on rx for sarcoidosis.\par 6/2018 - developed SOB, went to [place], found to have anemia. 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 Since on hydrocortisone, feels well.\par MRI of pituitary 8/2018 revealed findings c/w neurosarcoidosis and ?possible meningioma (though I believe this may be a sarcoid nodule) in interhemispheric fissure with surrounding edema. Referred to neurology.\par 9/2018 - had LP showing atypical lymphocytes, wbc 27. had unremarkable CT abd/pelvis 10/2018. He also had CT chest in 6/2018 showing nodules that were thought related to prior sarcoid dx. Never got planned steroids and was lost to f/u X1y until 10/2019.\par 1/2022 - Was lost to f/u x1y. Was prescribed prednisone 20mg and cellcept 500bid in 1/2021 but never started cellcept. In 1/2022, he reports having been on 10mg daily prednisone for a long time.\par \par \par S: Was lost to f/u x1y. Fully retired since 2019, going well. No new neurological symptoms. Feels well other than chronic intermittent R facial numbness - used to be constant but now not as common. \par \par Was prescribed prednisone 20mg and cellcept 500bid in 1/2021 but never started cellcept. In 1/2022, he reports having been on 10mg daily prednisone.\par \par MEDS:\par synthroid 75mcg\par prednisone 10mg\par testosterone\par \par O:\par AO3. Normally conversant. Follows commands, names, and repeats. Good attention.\par face symm\par moves both arms well\par \par \par CSF 9/2018\par WBC 27\par PCR neg\par crypto neg\par igg index high\par culture neg\par glucose 36\par prot 126\par fungal neg\par afb neg\par lactate neg\par LDH neg\par hiso neg\par VDRL neg\par [place] igg pos, igm neg\par ACE nl\par cytology with atypical lymphocytes\par \par \par MRI brain 2016 - read as enhancing likely-extra axial mass, interhemispheric, likely meningioma but cannot r/o sarcoid. [id number]. read as some edema.\par \par MRI brain 8/2018 - diffuse leptomeningeal enhancement. interhemispheric lesion is read as ?meningioma vs dural met, though I believe it may also be c/w neurosarcoidosis. The dominant interhemispheric lesion may actually be a left frontal cortical lesion, as the edema is more pronounced in the left hemisphere and it is not clearly dural based. [id number] on my review.\par \par MRI brain 12/2019 - report not yet available. on my review, notable for slight growth of frontal interhemispheric lesion with increase in surrounding edema. diffuse leptomeningeal enhancement is similar on my review.\par \par MRI brain 3/2020 - reviewed, lepto has almost resolved, and large interhemispheric lesion is much improved, edema much improved.\par \par MRI brain 6/2020 - interhemispheric lesion continues to shrink substantially. however, some small new areas of lepto.\par \par MRI brain 12/2020 - somewhat worsened lepto, previously dominant lesion is still smaller than pre-treatment\par \par \par AP: Neurosarcoidosis. I believe that the endocrine pathology the pt has had is likely related to the neurosarcoid seen on MRI. Was lost to follow up between 2018 to late 2019 x1y. Clinically stable in 2020, but some worsening radiographically in 12/2020, though midline lesion continued to improve. Then lost to f/u again from 1/2021 to 1/2022. clinically stable in 1/2022.\par \par Education provided. All questions answered. Management discussed at length. Pt has chronic illness that poses a threat to bodily function.\par \par Counseled on the need to follow up more frequently than every year. \par \par - new brain MRI\par - cont prednisone 10mg daily\par - consider cellcept 1bid (discussed risks/benefits with pt) if MRI is still highly abnormal.\par - f/u with Dr. Hardin\par - RTC 6wks to discuss MRI results.

## 2022-02-23 ENCOUNTER — APPOINTMENT (OUTPATIENT)
Dept: ENDOCRINOLOGY | Facility: CLINIC | Age: 57
End: 2022-02-23
Payer: COMMERCIAL

## 2022-02-23 VITALS
HEART RATE: 82 BPM | SYSTOLIC BLOOD PRESSURE: 119 MMHG | WEIGHT: 217 LBS | BODY MASS INDEX: 32.99 KG/M2 | DIASTOLIC BLOOD PRESSURE: 68 MMHG

## 2022-02-23 LAB
GLUCOSE BLDC GLUCOMTR-MCNC: 78
HBA1C MFR BLD HPLC: 5.7

## 2022-02-23 PROCEDURE — 83036 HEMOGLOBIN GLYCOSYLATED A1C: CPT | Mod: QW

## 2022-02-23 PROCEDURE — 82962 GLUCOSE BLOOD TEST: CPT

## 2022-02-23 PROCEDURE — 99214 OFFICE O/P EST MOD 30 MIN: CPT | Mod: 25

## 2022-02-24 ENCOUNTER — TRANSCRIPTION ENCOUNTER (OUTPATIENT)
Age: 57
End: 2022-02-24

## 2022-02-24 LAB
25(OH)D3 SERPL-MCNC: 37.1 NG/ML
ALBUMIN SERPL ELPH-MCNC: 4.3 G/DL
ALP BLD-CCNC: 75 U/L
ALT SERPL-CCNC: 23 U/L
ANION GAP SERPL CALC-SCNC: 13 MMOL/L
AST SERPL-CCNC: 22 U/L
BILIRUB SERPL-MCNC: 0.4 MG/DL
BUN SERPL-MCNC: 11 MG/DL
CALCIUM SERPL-MCNC: 9.3 MG/DL
CHLORIDE SERPL-SCNC: 103 MMOL/L
CHOLEST SERPL-MCNC: 197 MG/DL
CO2 SERPL-SCNC: 25 MMOL/L
CREAT SERPL-MCNC: 1.32 MG/DL
GLUCOSE SERPL-MCNC: 82 MG/DL
HDLC SERPL-MCNC: 62 MG/DL
LDLC SERPL CALC-MCNC: 113 MG/DL
NONHDLC SERPL-MCNC: 134 MG/DL
POTASSIUM SERPL-SCNC: 4.4 MMOL/L
PROT SERPL-MCNC: 6.9 G/DL
SODIUM SERPL-SCNC: 140 MMOL/L
T4 FREE SERPL-MCNC: 1.3 NG/DL
TRIGL SERPL-MCNC: 105 MG/DL
TSH SERPL-ACNC: 0.01 UIU/ML

## 2022-02-24 NOTE — PHYSICAL EXAM
[Alert] : alert [Healthy Appearance] : healthy appearance [No Acute Distress] : no acute distress [Normal Sclera/Conjunctiva] : normal sclera/conjunctiva [Visual Fields Grossly Intact] : visual fields grossly intact [Normal Hearing] : hearing was normal [No Respiratory Distress] : no respiratory distress [No Stigmata of Cushings Syndrome] : no stigmata of Cushings Syndrome [Normal Gait] : normal gait [Normal Insight/Judgement] : insight and judgment were intact [Kyphosis] : no kyphosis present [Acanthosis Nigricans] : no acanthosis nigricans [de-identified] : no moon facies, no supraclavicular fat pads

## 2022-02-24 NOTE — ASSESSMENT
[FreeTextEntry1] : Adrenal insufficiency. Cosyntropin stimulation testing at his initial visit was significant for an inappropriate response with relatively low ACTH. He had been taking prednisone 10-20 mg daily per recommendation from neurology, currently 10 mg daily. We reviewed proper use and compliance with prednisone. \par Continue prednisone at supraphysiologic dosing as per neurology; advised he needs to take at least 5 mg daily for the rest of his life\par Patient advised to take 3 times normal dose with illness or stress\par Patient advised to go to the emergency department immediately if he has nausea and cannot take prednisone\par Patient was again advised to obtain a medical alert bracelet\par \par Secondary hypogonadism. He was noted to have undetectable testosterone and low FSH and LH. Clomiphene failed to raise testosterone. His bone density is within the normal range. He is not currently interested in testosterone replacement options. \par Continue to monitor\par \par Central hypothyroidism. Free T4 was low, with an inappropriately normal TSH, consistent with central hypothyroidism. We reviewed proper use and compliance with levothyroxine.\par Continue levothyroxine 75 mcg daily\par Monitor free T4; **TSH is not reliable in central hypothyroidism**\par \par Diabetes insipidus. He was diagnosed with diabetes insipidus, unmasked by hydrocortisone. He prefers to remain off therapy for now, and is able to balance fluid intake with urine output. Serum sodium has been stable.\par \par Hyperlipidemia.  mg/dL in October 2021. We started atorvastatin last visit. We will repeat a lipid panel today.\par \par Weight gain. Point-of-care HbA1c 5.7% today. We discussed lifestyle modifications for weight loss. He has declined referral to nutrition.\par \par Bone health/vitamin D deficiency. Bone density within the normal range. Continue ergocalciferol 50,000 intl units every other week pending level. \par \par Neurosarcoidosis. He has been taking prednisone 10 mg daily. He has reestablished care with Dr. Liset Reagan.\par \par Positive metamyelocytes. I again referred him to hematology providers within the [facility] system.\par \par Return to clinic in 4 months. Patient advised to call me immediately with any questions or concerns.\par \par CC:\par Dr. Karthikeyan Spain, Fax [phone]

## 2022-02-24 NOTE — ADDENDUM
[FreeTextEntry1] : Recent laboratory results as below. LDL improved and recommend continuing current regimen of atorvastatin. Free T4 within range and recommend continuing current regimen of levothyroxine. Creatinine borderline elevated but eGFR within range. Vitamin D within range and recommend continuing current regimen of ergocalciferol. I left a telephone message for Mr. Rivera to discuss and will send a Portal message with results. 2/24/22

## 2022-02-24 NOTE — HISTORY OF PRESENT ILLNESS
[FreeTextEntry1] : Mr. Rivera is a 56 year-old man with a history of neurosarcoidosis and pituitary dysfunction presenting for follow-up of his endocrine issues. I saw him for an initial visit in July 2018 and last in October 2021. 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 Liset Reagan/neurology.\par \par Adrenal insufficiency.\par At his initial visit, he had a history of an unintentional 70-pound weight loss starting in October 2014. He had loss of appetite, early satiety, nausea/vomiting with many foods, constipation. He had a an endoscopy/colonoscopy for evaluation of his symptoms per prior notes.\par At his initial visit he also had dizziness when rising from supine or seated positions, including an episode of syncope. He also had occasional headaches, dyspnea on exertion, cold intolerance. \par We started hydrocortisone 10 mg/5 mg daily after cosyntropin stimulation testing at his initial visit, significant for an inappropriate response (less than 18 mcg/dL after cosyntropin) with relatively low ACTH. \par He was not able to take hydrocortisone twice daily and we switched to prednisone 5 mg daily. He had subsequently been taking prednisone 10-20 mg daily for neurosarcoidosis, currently 10 mg daily.\par \par Central hypothyroidism.\par Free T4 was low, with an inappropriately normal TSH, consistent with central hypothyroidism.\par We started levothyroxine 75 mcg daily after starting hydrocortisone.\par He is taking levothyroxine in the morning, on an empty stomach, with plain water, and waiting at least 30 minutes before eating. He is not taking calcium/iron/multivitamin. \par Free T4 has been mid-range on this regimen.\par \par Hypogonadotrophic hypogonadism. \par He had symptoms of erectile dysfunction starting around 2016, including decreased frequency of morning erections and difficulty maintaining erections. No change in libido, shaving frequency, muscle weakness.\par He was noted to have undetectable testosterone and low FSH and LH in August 2018.\par He started testosterone 5 gm daily in October 2018 after issues with obtaining authorization through his insurance. He has not been taking testosterone replacement therapy regularly. Clomiphene failed to raise testosterone and we discussed restarting testosterone gel and strongly encouraged compliance.\par \par Diabetes insipidus.\par He noted significantly increase urine output after starting hydrocortisone. The issue was brought to my attention in October 2018. \par We started desmopressin 0.05 (1/2 0.1 mg) pill once daily in October 2018, subsequently twice daily due to incomplete symptom relief. \par He was taking 0.1 mg daily since he was unable to take medications twice daily due to work. He discontinued therapy in February 2020 and has been matching fluid intake with urine output.\par \par Interim History \par Laboratory results from last visit as below. Free T4 within range on current regimen of levothyroxine and recommended continuing 75 mcg daily. Testosterone remained very low; he preferred a trial of a higher dose of testosterone gel prior to consideration of intramuscular injections and we will adjust his dose to 100 mg daily. LDL >190 mg/dL and we discussed the risks and benefits of statin therapy, including but not limited to arthralgias and myalgias. He is amenable to a trial of atorvastatin 40 mg daily. Other test results within range. \par He has not been taking testosterone gel regularly. \par He has seen Dr. Liset Reagan; note reviewed. 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 \par He feels well today. Weight is stable since last visit. No palpitations, diarrhea/constipation, hair/skin changes, depression/anxiety. \par Medical and surgical history, medications, allergies, social and family history reviewed and updated as needed.

## 2022-03-21 ENCOUNTER — TRANSCRIPTION ENCOUNTER (OUTPATIENT)
Age: 57
End: 2022-03-21

## 2022-04-01 ENCOUNTER — OUTPATIENT (OUTPATIENT)
Dept: OUTPATIENT SERVICES | Facility: HOSPITAL | Age: 57
LOS: 1 days | End: 2022-04-01
Payer: COMMERCIAL

## 2022-04-01 ENCOUNTER — APPOINTMENT (OUTPATIENT)
Dept: MRI IMAGING | Facility: CLINIC | Age: 57
End: 2022-04-01
Payer: COMMERCIAL

## 2022-04-01 DIAGNOSIS — D86.89 SARCOIDOSIS OF OTHER SITES: ICD-10-CM

## 2022-04-01 DIAGNOSIS — Z00.8 ENCOUNTER FOR OTHER GENERAL EXAMINATION: ICD-10-CM

## 2022-04-01 PROCEDURE — A9585: CPT

## 2022-04-01 PROCEDURE — 70553 MRI BRAIN STEM W/O & W/DYE: CPT | Mod: 26

## 2022-04-01 PROCEDURE — 70553 MRI BRAIN STEM W/O & W/DYE: CPT

## 2022-04-05 ENCOUNTER — NON-APPOINTMENT (OUTPATIENT)
Age: 57
End: 2022-04-05

## 2022-06-15 ENCOUNTER — APPOINTMENT (OUTPATIENT)
Dept: ENDOCRINOLOGY | Facility: CLINIC | Age: 57
End: 2022-06-15

## 2022-08-29 ENCOUNTER — TRANSCRIPTION ENCOUNTER (OUTPATIENT)
Age: 57
End: 2022-08-29

## 2022-09-02 ENCOUNTER — NON-APPOINTMENT (OUTPATIENT)
Age: 57
End: 2022-09-02

## 2022-09-02 ENCOUNTER — TRANSCRIPTION ENCOUNTER (OUTPATIENT)
Age: 57
End: 2022-09-02

## 2022-09-06 ENCOUNTER — TRANSCRIPTION ENCOUNTER (OUTPATIENT)
Age: 57
End: 2022-09-06

## 2022-11-14 ENCOUNTER — APPOINTMENT (OUTPATIENT)
Dept: NEUROLOGY | Facility: CLINIC | Age: 57
End: 2022-11-14

## 2022-11-14 VITALS
WEIGHT: 216 LBS | BODY MASS INDEX: 32.74 KG/M2 | HEIGHT: 68 IN | HEART RATE: 69 BPM | DIASTOLIC BLOOD PRESSURE: 77 MMHG | SYSTOLIC BLOOD PRESSURE: 121 MMHG

## 2022-11-14 LAB
ALBUMIN SERPL ELPH-MCNC: 4.3 G/DL
ALP BLD-CCNC: 83 U/L
ALT SERPL-CCNC: 26 U/L
ANION GAP SERPL CALC-SCNC: 11 MMOL/L
AST SERPL-CCNC: 25 U/L
BASOPHILS # BLD AUTO: 0.12 K/UL
BASOPHILS NFR BLD AUTO: 1.2 %
BILIRUB SERPL-MCNC: 0.6 MG/DL
BUN SERPL-MCNC: 11 MG/DL
CALCIUM SERPL-MCNC: 9.7 MG/DL
CHLORIDE SERPL-SCNC: 101 MMOL/L
CO2 SERPL-SCNC: 28 MMOL/L
CREAT SERPL-MCNC: 1.2 MG/DL
EGFR: 71 ML/MIN/1.73M2
EOSINOPHIL # BLD AUTO: 0.14 K/UL
EOSINOPHIL NFR BLD AUTO: 1.4 %
GLUCOSE SERPL-MCNC: 82 MG/DL
HCT VFR BLD CALC: 39 %
HGB BLD-MCNC: 12.4 G/DL
IMM GRANULOCYTES NFR BLD AUTO: 4.1 %
LYMPHOCYTES # BLD AUTO: 0.99 K/UL
LYMPHOCYTES NFR BLD AUTO: 10.1 %
MAN DIFF?: NORMAL
MCHC RBC-ENTMCNC: 27.3 PG
MCHC RBC-ENTMCNC: 31.8 GM/DL
MCV RBC AUTO: 85.7 FL
MONOCYTES # BLD AUTO: 1.05 K/UL
MONOCYTES NFR BLD AUTO: 10.7 %
NEUTROPHILS # BLD AUTO: 7.12 K/UL
NEUTROPHILS NFR BLD AUTO: 72.5 %
PLATELET # BLD AUTO: 307 K/UL
POTASSIUM SERPL-SCNC: 4.5 MMOL/L
PROT SERPL-MCNC: 7.3 G/DL
RBC # BLD: 4.55 M/UL
RBC # FLD: 13.3 %
SODIUM SERPL-SCNC: 140 MMOL/L
T4 FREE SERPL-MCNC: 1.3 NG/DL
TSH SERPL-ACNC: 0.01 UIU/ML
WBC # FLD AUTO: 9.82 K/UL

## 2022-11-14 PROCEDURE — 99214 OFFICE O/P EST MOD 30 MIN: CPT

## 2022-11-17 NOTE — HISTORY OF PRESENT ILLNESS
[FreeTextEntry1] : Initial hx 8/2018\par Retired in 9/2019\par 2014 - R sided facial numbness (no pain) after wisdom tooth extraction, involving the entire face even up to forehead and scalp. has been stable, persists to date. \par 2015 - Went to see neurologist who gave him pills for neuropathy. after got medication, began to have tingling and numbness in fingers, was told he has CTS. then switched medication which made him have mood alteration so he stopped that, then a 3rd medication began to make him dizzy so he stopped that one as well. \par 2015 - progressively worsening dizziness, SOB, generalized weakness, fatigue. went to [facility], admitted x8d, found anemia.\par 2016 - referred to pulm, found to have sarcoidosis after lymph node biopsy. sent to another neurologist at [facility] (Dr. Luciano ), had brain MRI that was reportedly normal.\par Between 2016 and 2018, was getting PFTs and they were stable. was never on rx for sarcoidosis.\par 6/2018 - developed SOB, went to [place], found to have anemia. 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 Since on hydrocortisone, feels well.\par MRI of pituitary 8/2018 revealed findings c/w neurosarcoidosis and ?possible meningioma (though I believe this may be a sarcoid nodule) in interhemispheric fissure with surrounding edema. Referred to neurology.\par 9/2018 - had LP showing atypical lymphocytes, wbc 27. had unremarkable CT abd/pelvis 10/2018. He also had CT chest in 6/2018 showing nodules that were thought related to prior sarcoid dx. Never got planned steroids and was lost to f/u X1y until 10/2019.\par 1/2020 - got IVMP, then prednisone taper.\par Lost to f/u, tapered down to 5m prednisone daily.\par 1/2021 - Was lost to f/u x1y. I advised to increase prednisone to 20mg and start cellcept 500bid in 1/2021 based on a new MRI in 12/2020 showing worsening, but he never started cellcept. \par In 1/2022, he reported having been on 10mg daily prednisone for a long time.\par New MRI 3/2022 showing mixed picture. \par Started cellcept 500 daily in 4/2022, then increased to 1g bid in 9/2022. No s/e. \par \par \par S: \par \par No new neurological symptoms.\par \par Since 1/2022, newly working for [employer] as . \par \par Feels well other than chronic intermittent R facial numbness - used to be constant but now not as common. \par \par Started cellcept 500 daily in early 2022, then increased to 1g bid in 9/2022. No s/e. \par \par \par MEDS:\par synthroid 75mcg\par prednisone 10mg\par cellcept 1g bid\par \par \par SHX: no tob, occ etoh, no drugs. [employer] .\par \par \par O:\par \par AO3. Normally conversant. Follows commands, names, and repeats. Good attention.\par \par pupils reactive, slight asymmetry: 2mm OD, 3mm OS in normal light, VFF, EOMI, no nystagmus, face symmetric, TUP at midline. \par \par Motor: \par  R: L:\par Del 5 5\par Bi 5 5\par Tri 5 5\par Wrist Extensors 5 5\par Finger abductors 5 5\par  5 5 \par \par HF 5 5\par KE 5 5\par KF 5 5\par DF 5 5\par PF 5 5\par \par Tone R L\par UE 0 0 \par LE 0 0\par \par Sensory RUE LUE RLE LLE \par LT + + + +\par Vib + + + +\par JPS + + + +\par PP + + + +\par Temp + + + +\par \par Reflexes:\par  R L \par Biceps 2 2\par BR 2 2\par Triceps 2 2\par Pat 2 2 \par AJ 2 2\par \par TOES WD WD\par \par \par Coordination:\par  R L \par FTN 0 0 \par EVON 0 0\par HTS 0 0 \par \par Other \par  \par Gait: normal, can heel/toe/tandem walk\par \par  Assistance: normal\par \par \par CSF 9/2018\par WBC 27\par PCR neg\par crypto neg\par igg index high\par culture neg\par glucose 36\par prot 126\par fungal neg\par afb neg\par lactate neg\par LDH neg\par hiso neg\par VDRL neg\par west nile igg pos, igm neg\par ACE nl\par cytology with atypical lymphocytes\par \par \par MRI brain 2016 - read as enhancing likely-extra axial mass, interhemispheric, likely meningioma but cannot r/o sarcoid. [id number]. read as some edema.\par \par MRI brain 8/2018 - diffuse leptomeningeal enhancement. interhemispheric lesion is read as ?meningioma vs dural met, though I believe it may also be c/w neurosarcoidosis. The dominant interhemispheric lesion may actually be a left frontal cortical lesion, as the edema is more pronounced in the left hemisphere and it is not clearly dural based. [id number] on my review.\par \par MRI brain 12/2019 - report not yet available. on my review, notable for slight growth of frontal interhemispheric lesion with increase in surrounding edema. diffuse leptomeningeal enhancement is similar on my review.\par \par MRI brain 3/2020 - reviewed, lepto has almost resolved, and large interhemispheric lesion is much improved, edema much improved.\par \par MRI brain 6/2020 - interhemispheric lesion continues to shrink substantially. however, some small new areas of lepto.\par \par MRI brain 12/2020 - somewhat worsened lepto, previously dominant lesion is still smaller than pre-treatment\par \par MRI brain 3/2022 - dominant lesion is a bit worse, other areas have improved but there is new disease as well. overall a mixed picture\par \par \par AP: Neurosarcoidosis. I believe that the endocrine pathology the pt has had is likely related to the neurosarcoid seen on MRI. Was lost to follow up between 2018 to late 2019 x1y. Clinically stable in 2020, but some worsening radiographically in 12/2020, though midline lesion continued to improve. Then lost to f/u again from 1/2021 to 1/2022. clinically stable in 1/2022. MRI in 3/2022 showed a mixed picture but he had not st\par \par Education provided. All questions answered. Management discussed at length. Pt has chronic illness that poses a threat to bodily function.\par \par Counseled on the need to follow up more frequently than every year. \par \par - new brain MRI in 1/2023 (4 months on 1g bid of cellcept)\par - cont prednisone 10mg daily\par - continue cellcept 1bid\par - blood work now.\par - f/u with Dr. Hardin\par - RTC 2-3m after new brain MRI

## 2022-12-13 ENCOUNTER — TRANSCRIPTION ENCOUNTER (OUTPATIENT)
Age: 57
End: 2022-12-13

## 2022-12-26 ENCOUNTER — TRANSCRIPTION ENCOUNTER (OUTPATIENT)
Age: 57
End: 2022-12-26

## 2023-01-10 ENCOUNTER — OUTPATIENT (OUTPATIENT)
Dept: OUTPATIENT SERVICES | Facility: HOSPITAL | Age: 58
LOS: 1 days | End: 2023-01-10
Payer: COMMERCIAL

## 2023-01-10 ENCOUNTER — APPOINTMENT (OUTPATIENT)
Dept: MRI IMAGING | Facility: CLINIC | Age: 58
End: 2023-01-10
Payer: COMMERCIAL

## 2023-01-10 DIAGNOSIS — D86.89 SARCOIDOSIS OF OTHER SITES: ICD-10-CM

## 2023-01-10 DIAGNOSIS — Z00.8 ENCOUNTER FOR OTHER GENERAL EXAMINATION: ICD-10-CM

## 2023-01-10 PROCEDURE — 76377 3D RENDER W/INTRP POSTPROCES: CPT | Mod: 26

## 2023-01-10 PROCEDURE — 76377 3D RENDER W/INTRP POSTPROCES: CPT

## 2023-01-10 PROCEDURE — 70553 MRI BRAIN STEM W/O & W/DYE: CPT

## 2023-01-10 PROCEDURE — A9585: CPT

## 2023-01-10 PROCEDURE — 70553 MRI BRAIN STEM W/O & W/DYE: CPT | Mod: 26

## 2023-01-17 ENCOUNTER — TRANSCRIPTION ENCOUNTER (OUTPATIENT)
Age: 58
End: 2023-01-17

## 2023-01-23 ENCOUNTER — TRANSCRIPTION ENCOUNTER (OUTPATIENT)
Age: 58
End: 2023-01-23

## 2023-01-23 ENCOUNTER — NON-APPOINTMENT (OUTPATIENT)
Age: 58
End: 2023-01-23

## 2023-02-06 ENCOUNTER — APPOINTMENT (OUTPATIENT)
Dept: NEUROLOGY | Facility: CLINIC | Age: 58
End: 2023-02-06
Payer: COMMERCIAL

## 2023-02-06 VITALS
HEIGHT: 68 IN | SYSTOLIC BLOOD PRESSURE: 113 MMHG | DIASTOLIC BLOOD PRESSURE: 67 MMHG | HEART RATE: 72 BPM | WEIGHT: 216 LBS | BODY MASS INDEX: 32.74 KG/M2

## 2023-02-06 PROCEDURE — 99214 OFFICE O/P EST MOD 30 MIN: CPT

## 2023-02-07 NOTE — HISTORY OF PRESENT ILLNESS
[FreeTextEntry1] : Initial hx 8/2018\par Retired in 9/2019\par 2014 - R sided facial numbness (no pain) after wisdom tooth extraction, involving the entire face even up to forehead and scalp. has been stable, persists to date. \par 2015 - Went to see neurologist who gave him pills for neuropathy. after got medication, began to have tingling and numbness in fingers, was told he has CTS. then switched medication which made him have mood alteration so he stopped that, then a 3rd medication began to make him dizzy so he stopped that one as well. \par 2015 - progressively worsening dizziness, SOB, generalized weakness, fatigue. went to [facility], admitted x8d, found anemia.\par 2016 - referred to pulm, found to have sarcoidosis after lymph node biopsy. sent to another neurologist at [facility] (Dr. Luciano ), had brain MRI that was reportedly normal.\par Between 2016 and 2018, was getting PFTs and they were stable. was never on rx for sarcoidosis.\par 6/2018 - developed SOB, went to [place], found to have anemia. 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 Since on hydrocortisone, feels well.\par MRI of pituitary 8/2018 revealed findings c/w neurosarcoidosis and ?possible meningioma (though I believe this may be a sarcoid nodule) in interhemispheric fissure with surrounding edema. Referred to neurology.\par 9/2018 - had LP showing atypical lymphocytes, wbc 27. had unremarkable CT abd/pelvis 10/2018. He also had CT chest in 6/2018 showing nodules that were thought related to prior sarcoid dx. Never got planned steroids and was lost to f/u X1y until 10/2019.\par 1/2020 - got IVMP, then prednisone taper.\par Lost to f/u, tapered down to 5m prednisone daily.\par 1/2021 - MRI was worse. Was lost to f/u x1y. I advised to increase prednisone to 20mg and start cellcept 500bid in 1/2021 based on a new MRI in 12/2020 showing worsening, but he never started cellcept. \par In 1/2022, he reported having been on 10mg daily prednisone for a long time.\par New MRI 3/2022 showing mixed picture. \par Although he reported starting cellcept 500 daily in 4/2022 and increasing to 1g bid in 9/2022, in 2/2023 he reports he never started this medication.\par \par \par S: \par \par No new neurological symptoms.\par \par Since 1/2022, newly working for [employer] as . \par \par Feels well other than chronic intermittent R facial numbness - used to be constant but now not as common. \par \par \par MEDS:\par synthroid 75mcg\par prednisone 10mg\par vitamin D\par \par \par SHX: no tob, occ etoh, no drugs. [employer] .\par \par \par O:\par \par AO3. Normally conversant. Follows commands, names, and repeats. Good attention.\par \par pupils reactive, slight asymmetry: 2mm OD, 3mm OS in normal light, VFF, EOMI, no nystagmus, face symmetric, TUP at midline. \par \par Motor: \par  R: L:\par Del 5 5\par Bi 5 5\par Tri 5 5\par Wrist Extensors 5 5\par Finger abductors 5 5\par  5 5 \par \par HF 5 5\par KE 5 5\par KF 5 5\par DF 5 5\par PF 5 5\par \par Tone R L\par UE 0 0 \par LE 0 0\par \par Sensory RUE LUE RLE LLE \par LT + + + +\par Vib + + + +\par JPS + + + +\par PP + + + +\par Temp + + + +\par \par Reflexes:\par  R L \par Biceps 2 2\par BR 2 2\par Triceps 2 2\par Pat 2 2 \par AJ 2 2\par \par TOES WD WD\par \par \par Coordination:\par  R L \par FTN 0 0 \par EVON 0 0\par HTS 0 0 \par \par Other \par  \par Gait: normal, can heel/toe/tandem walk\par \par  Assistance: normal\par \par \par CSF 9/2018\par WBC 27\par PCR neg\par crypto neg\par igg index high\par culture neg\par glucose 36\par prot 126\par fungal neg\par afb neg\par lactate neg\par LDH neg\par hiso neg\par VDRL neg\par west nile igg pos, igm neg\par ACE nl\par cytology with atypical lymphocytes\par \par \par MRI brain 2016 - read as enhancing likely-extra axial mass, interhemispheric, likely meningioma but cannot r/o sarcoid. [id number]. read as some edema.\par \par MRI brain 8/2018 - diffuse leptomeningeal enhancement. interhemispheric lesion is read as ?meningioma vs dural met, though I believe it may also be c/w neurosarcoidosis. The dominant interhemispheric lesion may actually be a left frontal cortical lesion, as the edema is more pronounced in the left hemisphere and it is not clearly dural based. [id number] on my review.\par \par MRI brain 12/2019 - report not yet available. on my review, notable for slight growth of frontal interhemispheric lesion with increase in surrounding edema. diffuse leptomeningeal enhancement is similar on my review.\par \par MRI brain 3/2020 - reviewed, lepto has almost resolved, and large interhemispheric lesion is much improved, edema much improved.\par \par MRI brain 6/2020 - interhemispheric lesion continues to shrink substantially. however, some small new areas of lepto.\par \par MRI brain 12/2020 - somewhat worsened lepto, previously dominant lesion is still smaller than pre-treatment\par \par MRI brain 3/2022 - dominant lesion is a bit worse, other areas have improved but there is new disease as well. overall a mixed picture\par \par MRI brain 1/2023 - read as stable, i think the main lesion is a bit smaller. no new dz. \par \par \par \par AP: Neurosarcoidosis. I believe that the endocrine pathology the pt has had is likely related to the neurosarcoid seen on MRI. Was lost to follow up between 2018 to late 2019 x1y. Clinically stable in 2020, but some worsening radiographically in 12/2020, though midline lesion continued to improve. Then lost to f/u again from 1/2021 to 1/2022. clinically stable in 1/2022. MRI in 3/2022 showed a mixed picture but he had not started cellcept at full dose. After 4m of 1g bid cellcept, a new brain MRI in 1/2023 was improved. \par \par Education provided. All questions answered. Management discussed at length. Pt has chronic illness that poses a threat to bodily function.\par \par Counseled on the need to follow up more frequently than every year. \par \par - start cellcept 500 bid\par - new brain MRI in 8/2023 (6m after starting cellcept)\par - cont prednisone 10mg daily\par - blood work in 3m\par - f/u with Dr. Hardin\par - RTC 3m

## 2023-05-30 ENCOUNTER — TRANSCRIPTION ENCOUNTER (OUTPATIENT)
Age: 58
End: 2023-05-30

## 2023-06-24 ENCOUNTER — TRANSCRIPTION ENCOUNTER (OUTPATIENT)
Age: 58
End: 2023-06-24

## 2023-09-05 ENCOUNTER — APPOINTMENT (OUTPATIENT)
Dept: ENDOCRINOLOGY | Facility: CLINIC | Age: 58
End: 2023-09-05
Payer: COMMERCIAL

## 2023-09-05 VITALS
HEART RATE: 91 BPM | WEIGHT: 224 LBS | SYSTOLIC BLOOD PRESSURE: 108 MMHG | BODY MASS INDEX: 34.06 KG/M2 | DIASTOLIC BLOOD PRESSURE: 68 MMHG

## 2023-09-05 DIAGNOSIS — E55.9 VITAMIN D DEFICIENCY, UNSPECIFIED: ICD-10-CM

## 2023-09-05 DIAGNOSIS — R73.09 OTHER ABNORMAL GLUCOSE: ICD-10-CM

## 2023-09-05 LAB
GLUCOSE BLDC GLUCOMTR-MCNC: 124
HBA1C MFR BLD HPLC: 5.5

## 2023-09-05 PROCEDURE — 36415 COLL VENOUS BLD VENIPUNCTURE: CPT

## 2023-09-05 PROCEDURE — 82962 GLUCOSE BLOOD TEST: CPT

## 2023-09-05 PROCEDURE — 99214 OFFICE O/P EST MOD 30 MIN: CPT | Mod: 25

## 2023-09-05 PROCEDURE — 83036 HEMOGLOBIN GLYCOSYLATED A1C: CPT | Mod: QW

## 2023-09-06 LAB
25(OH)D3 SERPL-MCNC: 50.4 NG/ML
ALBUMIN SERPL ELPH-MCNC: 4.4 G/DL
ALP BLD-CCNC: 75 U/L
ALT SERPL-CCNC: 25 U/L
ANION GAP SERPL CALC-SCNC: 14 MMOL/L
AST SERPL-CCNC: 22 U/L
BASOPHILS # BLD AUTO: 0.08 K/UL
BASOPHILS NFR BLD AUTO: 0.7 %
BILIRUB SERPL-MCNC: 0.3 MG/DL
BUN SERPL-MCNC: 9 MG/DL
CALCIUM SERPL-MCNC: 9.7 MG/DL
CHLORIDE SERPL-SCNC: 102 MMOL/L
CHOLEST SERPL-MCNC: 254 MG/DL
CO2 SERPL-SCNC: 26 MMOL/L
CREAT SERPL-MCNC: 1.2 MG/DL
EGFR: 71 ML/MIN/1.73M2
EOSINOPHIL # BLD AUTO: 0.09 K/UL
EOSINOPHIL NFR BLD AUTO: 0.8 %
GLUCOSE SERPL-MCNC: 124 MG/DL
HCT VFR BLD CALC: 41.5 %
HDLC SERPL-MCNC: 61 MG/DL
HGB BLD-MCNC: 12.5 G/DL
IMM GRANULOCYTES NFR BLD AUTO: 2.1 %
LDLC SERPL CALC-MCNC: 165 MG/DL
LYMPHOCYTES # BLD AUTO: 0.79 K/UL
LYMPHOCYTES NFR BLD AUTO: 7.4 %
MAN DIFF?: NORMAL
MCHC RBC-ENTMCNC: 27.1 PG
MCHC RBC-ENTMCNC: 30.1 GM/DL
MCV RBC AUTO: 90 FL
MONOCYTES # BLD AUTO: 0.68 K/UL
MONOCYTES NFR BLD AUTO: 6.3 %
NEUTROPHILS # BLD AUTO: 8.86 K/UL
NEUTROPHILS NFR BLD AUTO: 82.7 %
NONHDLC SERPL-MCNC: 193 MG/DL
PLATELET # BLD AUTO: 284 K/UL
POTASSIUM SERPL-SCNC: 4.4 MMOL/L
PROT SERPL-MCNC: 7.2 G/DL
RBC # BLD: 4.61 M/UL
RBC # FLD: 13.5 %
SODIUM SERPL-SCNC: 142 MMOL/L
T4 FREE SERPL-MCNC: 1.2 NG/DL
TESTOST SERPL-MCNC: <2.5 NG/DL
TRIGL SERPL-MCNC: 153 MG/DL
VIT B12 SERPL-MCNC: 1228 PG/ML
WBC # FLD AUTO: 10.72 K/UL

## 2023-09-06 NOTE — PHYSICAL EXAM
[Alert] : alert [Healthy Appearance] : healthy appearance [No Acute Distress] : no acute distress [Normal Sclera/Conjunctiva] : normal sclera/conjunctiva [Visual Fields Grossly Intact] : visual fields grossly intact [Normal Hearing] : hearing was normal [No Respiratory Distress] : no respiratory distress [No Stigmata of Cushings Syndrome] : no stigmata of Cushings Syndrome [Normal Gait] : normal gait [Normal Insight/Judgement] : insight and judgment were intact [Kyphosis] : no kyphosis present [Acanthosis Nigricans] : no acanthosis nigricans [de-identified] : no moon facies, no supraclavicular fat pads

## 2023-09-06 NOTE — ADDENDUM
[FreeTextEntry1] : Recent laboratory results as below; discussed with Mr. Rivera. White blood cell count borderline elevated and hemoglobin borderline low as they have been; recommended follow-up with primary care. Glucose not fasting. Free T4 within the normal range. Lipid panel not at goal and recommended reinitiation of atorvastatin as above. Testosterone low as per previous; we are restarting testosterone therapy as above. 9/06/23

## 2023-09-06 NOTE — HISTORY OF PRESENT ILLNESS
[FreeTextEntry1] : Mr. Rivera is a 58 year-old man with a history of neurosarcoidosis and pituitary dysfunction presenting for follow-up of his endocrine issues. I saw him for an initial visit in July 2018 and last in February 2022. 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 Liset Reagan/neurology.  Adrenal insufficiency. At his initial visit, he had a history of an unintentional 70-pound weight loss starting in October 2014. He had loss of appetite, early satiety, nausea/vomiting with many foods, constipation. He had an endoscopy/colonoscopy for evaluation of his symptoms per prior notes. At his initial visit he also had dizziness when rising from supine or seated positions, including an episode of syncope. He also had occasional headaches, dyspnea on exertion, cold intolerance.  We started hydrocortisone 10 mg/5 mg daily after cosyntropin stimulation testing at his initial visit, significant for an inappropriate response (less than 18 mcg/dL after cosyntropin) with relatively low ACTH.  He was not able to take hydrocortisone twice daily and we switched to prednisone 5 mg daily. He had subsequently been taking prednisone 10-20 mg daily for neurosarcoidosis, currently 10 mg daily.  Central hypothyroidism. Free T4 was low, with an inappropriately normal TSH, consistent with central hypothyroidism. We started levothyroxine 75 mcg daily after starting hydrocortisone. He is taking levothyroxine in the morning, on an empty stomach, with plain water, and waiting at least 30 minutes before eating. He is not taking calcium/iron/multivitamin.  Free T4 has been mid-range on this regimen.  Hypogonadotropic hypogonadism.  He had symptoms of erectile dysfunction starting around 2016, including decreased frequency of morning erections and difficulty maintaining erections. No change in libido, shaving frequency, muscle weakness. He was noted to have undetectable testosterone and low FSH and LH in August 2018. He started testosterone 5 gm daily in October 2018 after issues with obtaining authorization through his insurance. He has not been taking testosterone replacement therapy regularly. Clomiphene failed to raise testosterone and we discussed restarting testosterone gel and strongly encouraged compliance. He had not been interested in repletion last visit but is now again interested.  Diabetes insipidus. He noted significantly increase urine output after starting hydrocortisone. The issue was brought to my attention in October 2018.  We started desmopressin 0.05 (1/2 0.1 mg) pill once daily in October 2018, subsequently twice daily due to incomplete symptom relief.  He was taking 0.1 mg daily since he was unable to take medications twice daily due to work. He discontinued therapy in February 2020 and has been matching fluid intake with urine output.  Interim History  He has been lost to follow-up.  He has seen Dr. Liset Reagan; note reviewed. He was started on mycophenolate with improvement in imaging.  He has been trying to maintain portion control and snacking less. He feels well today. Weight is up 7 pounds since last visit. No palpitations, diarrhea/constipation, hair/skin changes, depression/anxiety.  Medical and surgical history, medications, allergies, social and family history reviewed and updated as needed.

## 2023-09-06 NOTE — ASSESSMENT
[FreeTextEntry1] : Adrenal insufficiency. Cosyntropin stimulation testing at his initial visit was significant for an inappropriate response with relatively low ACTH. He had been taking prednisone 10-20 mg daily per recommendation from neurology, currently 10 mg daily. We have reviewed proper use and compliance with prednisone.  Continue prednisone at supraphysiologic dosing as per neurology; advised he needs to take at least 5 mg daily for the rest of his life Patient advised to take 3 times normal dose with illness or stress Patient advised to go to the emergency department immediately if he has nausea and cannot take prednisone Patient was again advised to obtain a medical alert bracelet  Secondary hypogonadism. He was noted to have undetectable testosterone and low FSH and LH. Clomiphene failed to raise testosterone. His bone density is within the normal range. He is interested in restarting testosterone gel.  Restart testosterone 10 g daily  Central hypothyroidism. Free T4 was low, with an inappropriately normal TSH, consistent with central hypothyroidism. We reviewed proper use and compliance with levothyroxine. Continue levothyroxine 75 mcg daily Monitor free T4; **TSH is not reliable in central hypothyroidism**  Diabetes insipidus. He was diagnosed with diabetes insipidus, unmasked by hydrocortisone. He prefers to remain off therapy for now, and is able to balance fluid intake with urine output. Serum sodium has been stable.  Hyperlipidemia.  mg/dL in October 2021. We started atorvastatin last visit but he has been off. We will restart atorvastatin and monitor his lipid penal.   Weight gain. Point-of-care HbA1c 5.5% and glucose 124 mg/dL today. I congratulated him on his improvement in glycemic control. We have discussed lifestyle modifications for weight loss. He has declined referral to nutrition.  Bone health/vitamin D deficiency. Bone density has been within the normal range; we can consider sending an interval test next visit. Continue ergocalciferol 50,000 intl units every other week pending level.   Neurosarcoidosis. He has been taking prednisone 10 mg daily. He has reestablished care with Dr. Liset Reagna.  Return to see Zoey Wen NP in 3 months. Patient advised to call me immediately with any questions or concerns.  CC: Dr. Karthikeyan Spain, Fax [phone]

## 2023-09-18 ENCOUNTER — APPOINTMENT (OUTPATIENT)
Dept: NEUROLOGY | Facility: CLINIC | Age: 58
End: 2023-09-18
Payer: COMMERCIAL

## 2023-09-18 VITALS
BODY MASS INDEX: 33.95 KG/M2 | HEART RATE: 66 BPM | WEIGHT: 224 LBS | SYSTOLIC BLOOD PRESSURE: 108 MMHG | HEIGHT: 68 IN | DIASTOLIC BLOOD PRESSURE: 71 MMHG

## 2023-09-18 PROCEDURE — 99215 OFFICE O/P EST HI 40 MIN: CPT

## 2023-09-18 RX ORDER — MYCOPHENOLATE MOFETIL 500 MG/1
500 TABLET ORAL
Qty: 60 | Refills: 2 | Status: DISCONTINUED | COMMUNITY
Start: 2022-04-05 | End: 2023-09-18

## 2023-10-02 DIAGNOSIS — D86.9 SARCOIDOSIS, UNSPECIFIED: ICD-10-CM

## 2023-10-10 LAB
HBV CORE IGG+IGM SER QL: NONREACTIVE
HBV SURFACE AB SER QL: REACTIVE
HBV SURFACE AG SER QL: NONREACTIVE

## 2023-10-11 LAB
M TB IFN-G BLD-IMP: NEGATIVE
QUANTIFERON TB PLUS MITOGEN MINUS NIL: >10 IU/ML
QUANTIFERON TB PLUS NIL: 0.05 IU/ML
QUANTIFERON TB PLUS TB1 MINUS NIL: 0.01 IU/ML
QUANTIFERON TB PLUS TB2 MINUS NIL: 0.01 IU/ML

## 2023-11-06 NOTE — ED ADULT NURSE REASSESSMENT NOTE - CONDITION
improved Topical Steroids Applications Pregnancy And Lactation Text: Most topical steroids are considered safe to use during pregnancy and lactation.  Any topical steroid applied to the breast or nipple should be washed off before breastfeeding.

## 2023-12-11 ENCOUNTER — APPOINTMENT (OUTPATIENT)
Dept: ENDOCRINOLOGY | Facility: CLINIC | Age: 58
End: 2023-12-11
Payer: COMMERCIAL

## 2023-12-11 VITALS
BODY MASS INDEX: 33.91 KG/M2 | DIASTOLIC BLOOD PRESSURE: 75 MMHG | SYSTOLIC BLOOD PRESSURE: 113 MMHG | WEIGHT: 223 LBS | HEART RATE: 76 BPM

## 2023-12-11 DIAGNOSIS — E23.2 DIABETES INSIPIDUS: ICD-10-CM

## 2023-12-11 LAB
GLUCOSE BLDC GLUCOMTR-MCNC: 86
HBA1C MFR BLD HPLC: 5.6

## 2023-12-11 PROCEDURE — 99213 OFFICE O/P EST LOW 20 MIN: CPT

## 2023-12-11 PROCEDURE — 83036 HEMOGLOBIN GLYCOSYLATED A1C: CPT | Mod: QW

## 2023-12-11 PROCEDURE — 82962 GLUCOSE BLOOD TEST: CPT

## 2023-12-13 ENCOUNTER — TRANSCRIPTION ENCOUNTER (OUTPATIENT)
Age: 58
End: 2023-12-13

## 2023-12-27 ENCOUNTER — OUTPATIENT (OUTPATIENT)
Dept: OUTPATIENT SERVICES | Facility: HOSPITAL | Age: 58
LOS: 1 days | End: 2023-12-27
Payer: COMMERCIAL

## 2023-12-27 ENCOUNTER — APPOINTMENT (OUTPATIENT)
Dept: MRI IMAGING | Facility: CLINIC | Age: 58
End: 2023-12-27
Payer: COMMERCIAL

## 2023-12-27 DIAGNOSIS — D86.89 SARCOIDOSIS OF OTHER SITES: ICD-10-CM

## 2023-12-27 PROCEDURE — 70553 MRI BRAIN STEM W/O & W/DYE: CPT

## 2023-12-27 PROCEDURE — 70553 MRI BRAIN STEM W/O & W/DYE: CPT | Mod: 26

## 2023-12-27 PROCEDURE — A9585: CPT

## 2024-01-09 ENCOUNTER — TRANSCRIPTION ENCOUNTER (OUTPATIENT)
Age: 59
End: 2024-01-09

## 2024-01-09 ENCOUNTER — NON-APPOINTMENT (OUTPATIENT)
Age: 59
End: 2024-01-09

## 2024-01-09 RX ORDER — ERGOCALCIFEROL 1.25 MG/1
1.25 MG CAPSULE, LIQUID FILLED ORAL
Qty: 12 | Refills: 3 | Status: ACTIVE | COMMUNITY
Start: 2021-04-15 | End: 1900-01-01

## 2024-01-10 ENCOUNTER — TRANSCRIPTION ENCOUNTER (OUTPATIENT)
Age: 59
End: 2024-01-10

## 2024-01-10 ENCOUNTER — NON-APPOINTMENT (OUTPATIENT)
Age: 59
End: 2024-01-10

## 2024-01-16 ENCOUNTER — TRANSCRIPTION ENCOUNTER (OUTPATIENT)
Age: 59
End: 2024-01-16

## 2024-01-17 ENCOUNTER — TRANSCRIPTION ENCOUNTER (OUTPATIENT)
Age: 59
End: 2024-01-17

## 2024-01-17 ENCOUNTER — NON-APPOINTMENT (OUTPATIENT)
Age: 59
End: 2024-01-17

## 2024-01-18 ENCOUNTER — TRANSCRIPTION ENCOUNTER (OUTPATIENT)
Age: 59
End: 2024-01-18

## 2024-01-18 RX ORDER — LEVOTHYROXINE SODIUM 0.07 MG/1
75 TABLET ORAL
Qty: 90 | Refills: 2 | Status: ACTIVE | COMMUNITY
Start: 2018-07-19 | End: 1900-01-01

## 2024-01-19 ENCOUNTER — TRANSCRIPTION ENCOUNTER (OUTPATIENT)
Age: 59
End: 2024-01-19

## 2024-01-19 ENCOUNTER — NON-APPOINTMENT (OUTPATIENT)
Age: 59
End: 2024-01-19

## 2024-01-19 LAB
HCT VFR BLD CALC: 40.2 %
HGB BLD-MCNC: 13.5 G/DL
MCHC RBC-ENTMCNC: 27.8 PG
MCHC RBC-ENTMCNC: 33.6 GM/DL
MCV RBC AUTO: 82.7 FL
PLATELET # BLD AUTO: 319 K/UL
PSA SERPL-MCNC: <0.01 NG/ML
RBC # BLD: 4.86 M/UL
RBC # FLD: 13.1 %
WBC # FLD AUTO: 6.91 K/UL

## 2024-01-22 ENCOUNTER — TRANSCRIPTION ENCOUNTER (OUTPATIENT)
Age: 59
End: 2024-01-22

## 2024-01-23 ENCOUNTER — TRANSCRIPTION ENCOUNTER (OUTPATIENT)
Age: 59
End: 2024-01-23

## 2024-01-29 ENCOUNTER — TRANSCRIPTION ENCOUNTER (OUTPATIENT)
Age: 59
End: 2024-01-29

## 2024-02-26 ENCOUNTER — APPOINTMENT (OUTPATIENT)
Dept: NEUROLOGY | Facility: CLINIC | Age: 59
End: 2024-02-26
Payer: COMMERCIAL

## 2024-02-26 ENCOUNTER — NON-APPOINTMENT (OUTPATIENT)
Age: 59
End: 2024-02-26

## 2024-02-26 VITALS
HEIGHT: 68 IN | HEART RATE: 65 BPM | BODY MASS INDEX: 32.89 KG/M2 | SYSTOLIC BLOOD PRESSURE: 120 MMHG | DIASTOLIC BLOOD PRESSURE: 79 MMHG | WEIGHT: 217 LBS

## 2024-02-26 PROCEDURE — G2211 COMPLEX E/M VISIT ADD ON: CPT

## 2024-02-26 PROCEDURE — 99215 OFFICE O/P EST HI 40 MIN: CPT

## 2024-02-26 RX ORDER — PREDNISONE 2.5 MG/1
2.5 TABLET ORAL
Qty: 168 | Refills: 0 | Status: ACTIVE | COMMUNITY
Start: 2018-11-15 | End: 1900-01-01

## 2024-02-26 RX ORDER — INFLIXIMAB 100 MG/10ML
100 INJECTION, POWDER, LYOPHILIZED, FOR SOLUTION INTRAVENOUS
Qty: 3 | Refills: 0 | Status: ACTIVE | COMMUNITY
Start: 2023-09-18 | End: 1900-01-01

## 2024-02-26 NOTE — HISTORY OF PRESENT ILLNESS
[FreeTextEntry1] : Initial hx 8/2018 Retired in 9/2019 2014 - R sided facial numbness (no pain) after wisdom tooth extraction, involving the entire face even up to forehead and scalp. has been stable, persists to date. 2015 - Went to see neurologist who gave him pills for neuropathy. after got medication, began to have tingling and numbness in fingers, was told he has CTS. then switched medication which made him have mood alteration so he stopped that, then a 3rd medication began to make him dizzy so he stopped that one as well. 2015 - progressively worsening dizziness, SOB, generalized weakness, fatigue. went to [facility], admitted x8d, found anemia. 2016 - referred to pulm, found to have sarcoidosis after lymph node biopsy. sent to another neurologist at [facility] (Dr. Luciano ), had brain MRI that was reportedly normal. Between 2016 and 2018, was getting PFTs and they were stable. was never on rx for sarcoidosis. 6/2018 - developed SOB, went to [place], found to have anemia. 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 Since on hydrocortisone, feels well. MRI of pituitary 8/2018 revealed findings c/w neurosarcoidosis and ?possible meningioma (though I believe this may be a sarcoid nodule) in interhemispheric fissure with surrounding edema. Referred to neurology. 9/2018 - had LP showing atypical lymphocytes, wbc 27. had unremarkable CT abd/pelvis 10/2018. He also had CT chest in 6/2018 showing nodules that were thought related to prior sarcoid dx. Never got planned steroids and was lost to f/u X1y until 10/2019. 1/2020 - got IVMP, then prednisone taper. Lost to f/u, tapered down to 5m prednisone daily. 1/2021 - MRI was worse. Was lost to f/u x1y. I advised to increase prednisone to 20mg and start cellcept 500bid in 1/2021 based on a new MRI in 12/2020 showing worsening, but he never started cellcept. In 1/2022, he reported having been on 10mg daily prednisone for a long time. New MRI 3/2022 showing mixed picture. Although he reported starting cellcept 500 daily in 4/2022 and increasing to 1g bid in 9/2022, in 2/2023 he reports he never started this medication. Never started cellcept due to insurance issues. 11/2023 - Started infliximab, uneventful   Subj interval:  No new neurological symptoms.  Since 1/2022, newly working for [employer] as .  Feels well other than chronic intermittent R facial numbness - constant still.   3 kids, all adults. 5 grandkids.   MEDS: synthroid 75mcg prednisone 10mg vitamin D lipitor   SHX: no tob, occ etoh, no drugs. [employer] .   O:  AO3. Normally conversant. Follows commands, names, and repeats. Good attention.  pupils reactive, VFF, EOMI, no nystagmus, face symmetric, TUP at midline.  Motor: R: L: Del 5 5 Bi 5 5 Tri 5 5 Wrist Extensors 5 5 Finger abductors 5 5  5 5  HF 5 5 KE 5 5 KF 5 5 DF 5 5 PF 5 5  Tone R L UE 0 0 LE 0 0  Sensory RUE LUE RLE LLE LT + + + + Vib + + + + JPS + + + + PP + + + + Temp + + + +  Reflexes: R L Biceps 2 2 BR 2 2 Triceps 2 2 Pat 2 2 AJ 2 2  TOES WD WD  Coordination: R L FTN 0 0 EVON 0 0 HTS 0 0  Other  Gait: normal, can heel/toe/tandem walk  Assistance: normal   CSF 9/2018 WBC 27 PCR neg crypto neg igg index high culture neg glucose 36 prot 126 fungal neg afb neg lactate neg LDH neg hiso neg VDRL neg [place] igg pos, igm neg ACE nl cytology with atypical lymphocytes   MRI brain 2016 - read as enhancing likely-extra axial mass, interhemispheric, likely meningioma but cannot r/o sarcoid. [id number]. read as some edema.  MRI brain 8/2018 - diffuse leptomeningeal enhancement. interhemispheric lesion is read as ?meningioma vs dural met, though I believe it may also be c/w neurosarcoidosis. The dominant interhemispheric lesion may actually be a left frontal cortical lesion, as the edema is more pronounced in the left hemisphere and it is not clearly dural based. [id number] on my review.  MRI brain 12/2019 - report not yet available. on my review, notable for slight growth of frontal interhemispheric lesion with increase in surrounding edema. diffuse leptomeningeal enhancement is similar on my review.  MRI brain 3/2020 - reviewed, lepto has almost resolved, and large interhemispheric lesion is much improved, edema much improved.  MRI brain 6/2020 - interhemispheric lesion continues to shrink substantially. however, some small new areas of lepto.  MRI brain 12/2020 - somewhat worsened lepto, previously dominant lesion is still smaller than pre-treatment  MRI brain 3/2022 - dominant lesion is a bit worse, other areas have improved but there is new disease as well. overall a mixed picture  MRI brain 1/2023 - read as stable, i think the main lesion is a bit smaller. no new dz.  MRI brain 12/2023 - somewhat improved overall, no new lesions.   AP: Neurosarcoidosis. I believe that the endocrine pathology the pt has had is likely related to the neurosarcoid seen on MRI. Was lost to follow up between 2018 to late 2019 x1y. Clinically stable in 2020, but some worsening radiographically in 12/2020, though midline lesion continued to improve. Then lost to f/u again from 1/2021 to 1/2022. clinically stable in 1/2022. MRI in 3/2022 showed a mixed picture but he had not started cellcept at full dose (or at all). A new brain MRI in 1/2023 was improved. In 2023, he reported that he had never started cellcept but that he has continued on low dose prednisone. Cellcept had a high copay so pt did not start.   Education provided. All questions answered. Management discussed at length. Pt has chronic illness that poses a threat to bodily function.  - cont infliximab, 5mg/kg at week 0,2,6 and then q8wks. - new brain MRI in 6m - taper prednisone by 2.5mg q4wks unless endocrine wants to keep on prednisone given adrenal insufficiency. corresponded w endo.  - blood work now and then q3m - f/u with Dr. Hardin - C 3m

## 2024-03-06 LAB
ALBUMIN SERPL ELPH-MCNC: 3.9 G/DL
ALP BLD-CCNC: 82 U/L
ALT SERPL-CCNC: 28 U/L
ANION GAP SERPL CALC-SCNC: 7 MMOL/L
AST SERPL-CCNC: 24 U/L
BASOPHILS # BLD AUTO: 0.1 K/UL
BASOPHILS NFR BLD AUTO: 1.2 %
BILIRUB SERPL-MCNC: 0.6 MG/DL
BUN SERPL-MCNC: 7 MG/DL
CALCIUM SERPL-MCNC: 9.3 MG/DL
CHLORIDE SERPL-SCNC: 103 MMOL/L
CO2 SERPL-SCNC: 30 MMOL/L
CREAT SERPL-MCNC: 1.23 MG/DL
EGFR: 68 ML/MIN/1.73M2
EOSINOPHIL # BLD AUTO: 0.14 K/UL
EOSINOPHIL NFR BLD AUTO: 1.6 %
GLUCOSE SERPL-MCNC: 88 MG/DL
HCT VFR BLD CALC: 39.9 %
HGB BLD-MCNC: 12.7 G/DL
IMM GRANULOCYTES NFR BLD AUTO: 4.8 %
LYMPHOCYTES # BLD AUTO: 1.2 K/UL
LYMPHOCYTES NFR BLD AUTO: 13.9 %
MAN DIFF?: NORMAL
MCHC RBC-ENTMCNC: 27.9 PG
MCHC RBC-ENTMCNC: 31.8 GM/DL
MCV RBC AUTO: 87.5 FL
MONOCYTES # BLD AUTO: 1.13 K/UL
MONOCYTES NFR BLD AUTO: 13.1 %
NEUTROPHILS # BLD AUTO: 5.64 K/UL
NEUTROPHILS NFR BLD AUTO: 65.4 %
PLATELET # BLD AUTO: 244 K/UL
POTASSIUM SERPL-SCNC: 4 MMOL/L
PROT SERPL-MCNC: 6.7 G/DL
RBC # BLD: 4.56 M/UL
RBC # FLD: 13.7 %
SODIUM SERPL-SCNC: 140 MMOL/L
WBC # FLD AUTO: 8.62 K/UL

## 2024-03-11 ENCOUNTER — APPOINTMENT (OUTPATIENT)
Dept: ENDOCRINOLOGY | Facility: CLINIC | Age: 59
End: 2024-03-11
Payer: COMMERCIAL

## 2024-03-11 VITALS
DIASTOLIC BLOOD PRESSURE: 72 MMHG | HEART RATE: 85 BPM | BODY MASS INDEX: 34.52 KG/M2 | WEIGHT: 227 LBS | SYSTOLIC BLOOD PRESSURE: 118 MMHG

## 2024-03-11 DIAGNOSIS — E03.8 OTHER SPECIFIED HYPOTHYROIDISM: ICD-10-CM

## 2024-03-11 DIAGNOSIS — E23.0 HYPOPITUITARISM: ICD-10-CM

## 2024-03-11 DIAGNOSIS — E78.5 HYPERLIPIDEMIA, UNSPECIFIED: ICD-10-CM

## 2024-03-11 DIAGNOSIS — E27.40 UNSPECIFIED ADRENOCORTICAL INSUFFICIENCY: ICD-10-CM

## 2024-03-11 LAB
GLUCOSE BLDC GLUCOMTR-MCNC: 92
HBA1C MFR BLD HPLC: 5.4

## 2024-03-11 PROCEDURE — 82962 GLUCOSE BLOOD TEST: CPT

## 2024-03-11 PROCEDURE — 36415 COLL VENOUS BLD VENIPUNCTURE: CPT

## 2024-03-11 PROCEDURE — 99213 OFFICE O/P EST LOW 20 MIN: CPT | Mod: 25

## 2024-03-11 PROCEDURE — 83036 HEMOGLOBIN GLYCOSYLATED A1C: CPT | Mod: QW

## 2024-03-11 NOTE — PHYSICAL EXAM
[Alert] : alert [No Acute Distress] : no acute distress [Normal Voice/Communication] : normal voice communication [Normal Hearing] : hearing was normal [No Respiratory Distress] : no respiratory distress [Normal Rate and Effort] : normal respiratory rate and effort [Clear to Auscultation] : lungs were clear to auscultation bilaterally [Normal Rate] : heart rate was normal [Regular Rhythm] : with a regular rhythm [Normal Gait] : normal gait [Oriented x3] : oriented to person, place, and time [Normal Affect] : the affect was normal [Normal Insight/Judgement] : insight and judgment were intact

## 2024-03-12 LAB
ALBUMIN SERPL ELPH-MCNC: 4.2 G/DL
ALP BLD-CCNC: 80 U/L
ALT SERPL-CCNC: 26 U/L
ANION GAP SERPL CALC-SCNC: 14 MMOL/L
AST SERPL-CCNC: 24 U/L
BILIRUB SERPL-MCNC: 0.4 MG/DL
BUN SERPL-MCNC: 7 MG/DL
CALCIUM SERPL-MCNC: 8.9 MG/DL
CHLORIDE SERPL-SCNC: 103 MMOL/L
CHOLEST SERPL-MCNC: 199 MG/DL
CO2 SERPL-SCNC: 24 MMOL/L
CREAT SERPL-MCNC: 1.23 MG/DL
EGFR: 68 ML/MIN/1.73M2
GLUCOSE SERPL-MCNC: 79 MG/DL
HDLC SERPL-MCNC: 58 MG/DL
LDLC SERPL CALC-MCNC: 126 MG/DL
NONHDLC SERPL-MCNC: 142 MG/DL
POTASSIUM SERPL-SCNC: 4.1 MMOL/L
PROT SERPL-MCNC: 6.8 G/DL
SODIUM SERPL-SCNC: 141 MMOL/L
T4 FREE SERPL-MCNC: 1.3 NG/DL
TRIGL SERPL-MCNC: 85 MG/DL
TSH SERPL-ACNC: 0.01 UIU/ML

## 2024-03-13 ENCOUNTER — TRANSCRIPTION ENCOUNTER (OUTPATIENT)
Age: 59
End: 2024-03-13

## 2024-03-13 LAB
TESTOST FREE SERPL-MCNC: 1.9 PG/ML
TESTOST SERPL-MCNC: 449 NG/DL

## 2024-03-13 NOTE — REVIEW OF SYSTEMS
[Negative] : Heme/Lymph [Fatigue] : no fatigue [Decreased Appetite] : appetite not decreased [Polyuria] : no polyuria

## 2024-03-13 NOTE — HISTORY OF PRESENT ILLNESS
[FreeTextEntry1] : Mr. CASTAÑEDA is a 58 year old male with pmhx of neurosarcoidosis and pituitary dysfunction who presents for pituitary follow-up.  Patient of Dr Hardin, last visit 9/5/2023 Last visit, 12/11/23, with myself  Interval change: Plan with last visit was to resume Testosterone 1% gel. Back on 10g (2 packets), for ~1 month and endorses ++improvement in ED, libido, energy. Continues prednisone 10mg in AM, plans to taper to 7.5mg. Aware to not decrease dose below 5mg given AI history.  Continues LT4 75mcg daily Denies fatigue, unintentional change in weight Endorses making dietary changes to encompass more vegetable and salad into diet   1. Adrenal insufficiency. At his initial visit, with Dr Hardin in 2018 he had a history of an unintentional 70-pound weight loss starting in October 2014. He had loss of appetite, early satiety, nausea/vomiting with many foods, constipation. He had an endoscopy/colonoscopy for evaluation of his symptoms per prior notes. At his initial visit he also had dizziness when rising from supine or seated positions, including an episode of syncope. He also had occasional headaches, dyspnea on exertion, cold intolerance. We started hydrocortisone 10 mg/5 mg daily after cosyntropin stimulation testing at his initial visit, significant for an inappropriate response (less than 18 mcg/dL after cosyntropin) with relatively low ACTH. He was not able to take hydrocortisone twice daily and we switched to prednisone 5 mg daily. He had subsequently been taking prednisone 10-20 mg daily for neurosarcoidosis, currently 10 mg daily.  2. Central hypothyroidism. Free T4 was low, with an inappropriately normal TSH, consistent with central hypothyroidism. We started levothyroxine 75 mcg daily after starting hydrocortisone. He is taking levothyroxine in the morning, on an empty stomach, with plain water, and waiting at least 30 minutes before eating. He is not taking calcium/iron/multivitamin. Free T4 has been mid-range on this regimen.  3. Hypogonadotropic hypogonadism. He had symptoms of erectile dysfunction starting around 2016, including decreased frequency of morning erections and difficulty maintaining erections. Endorses decreased libido. No change in shaving frequency, muscle weakness. He was noted to have undetectable testosterone and low FSH and LH in August 2018. He started testosterone 5 gm daily in October 2018 after issues with obtaining authorization through his insurance. He has not been taking testosterone replacement therapy regularly. Clomiphene failed to raise testosterone and we discussed restarting testosterone gel and strongly encouraged compliance.  4. Diabetes insipidus. He noted significantly increase urine output after starting hydrocortisone. The issue was brought to my attention in October 2018. We started desmopressin 0.05 (1/2 0.1 mg) pill once daily in October 2018, subsequently twice daily due to incomplete symptom relief. He was taking 0.1 mg daily since he was unable to take medications twice daily due to work. He discontinued therapy in February 2020 and has been matching fluid intake with urine output.

## 2024-03-13 NOTE — ADDENDUM
[FreeTextEntry1] : 3/12/24, addendumNADIRA  Labs partially finalized, I will review with patient once finalized  3/13/24, NADIRA bean [facility] message sent with lab interpretation

## 2024-03-13 NOTE — ASSESSMENT
[FreeTextEntry1] : 1. Adrenal insufficiency.  Cosyntropin stimulation testing at his initial visit was significant for an inappropriate response with relatively low ACTH. He had been taking prednisone 10-20 mg daily per recommendation from neurology. Current prednisone regimen of 10 mg daily. Continue prednisone at supraphysiologic dosing as per neurology; advised he needs to take at least 5 mg daily for the rest of his life Patient advised to take 3 times normal dose with illness or stress Patient advised to go to the emergency department immediately if he has nausea and cannot take prednisone -- reiterated to patient the clive to not discontinue prednisone therapy and minimal need for prednisone 5mg for the rest of his life.  Discussed ok to trial decreasing dose, as instructed with neurologist and that he may need slower taper, such as alternating 10mg/7.5mg for 1-2 weeks before fully going to 7.5mg/daily, pending response to decreasing dose from 10mg to 7.5mg daily  2, Secondary hypogonadism.  He was noted to have undetectable testosterone and low FSH and LH.  Clomiphene failed to raise testosterone.  His bone density (May 2021) is within the normal range.  Since resuming testosterone 1% gel (10g = 100mg) daily, he endorses improvement of libido, ED, fatigue -- continue testosterone gel 10 g daily -- labs today  3. Central hypothyroidism.  Free T4 was low, with an inappropriately normal TSH, consistent with central hypothyroidism.  Current regimen: LT4 75mcg QAM Monitor free T4; **TSH is not reliable in central hypothyroidism** -- Continue levothyroxine 75 mcg daily -- labs today  4. Diabetes insipidus.  He was diagnosed with diabetes insipidus, unmasked by hydrocortisone.  He prefers to remain off therapy for now, and is able to balance fluid intake with urine output. Serum sodium has been stable.  5. Hyperlipidemia.   mg/dL in October 2021. Started atorvastatin following these labs, lapse of therapy, but resumed recently --continue atorvastatin -- repeat labs (fasting).  6. Weight gain.  A1C 5.4% (3/11/24) from 5.6% (12/2023) Continue diet modifications  7. Bone health/vitamin D deficiency.  Bone density has been within the normal range; we can consider sending an interval test next visit.  Continue ergocalciferol 50,000 intl units every other week pending level.   Follow-up with Dr. Hardin or myself in 3 months  Zoey Wen MS, [facility], [facility] 03/11/2024

## 2024-05-31 RX ORDER — ATORVASTATIN CALCIUM 40 MG/1
40 TABLET, FILM COATED ORAL
Qty: 90 | Refills: 2 | Status: ACTIVE | COMMUNITY
Start: 2021-10-21 | End: 1900-01-01

## 2024-06-03 ENCOUNTER — APPOINTMENT (OUTPATIENT)
Dept: NEUROLOGY | Facility: CLINIC | Age: 59
End: 2024-06-03
Payer: COMMERCIAL

## 2024-06-03 VITALS
BODY MASS INDEX: 34.4 KG/M2 | WEIGHT: 227 LBS | HEART RATE: 64 BPM | DIASTOLIC BLOOD PRESSURE: 80 MMHG | SYSTOLIC BLOOD PRESSURE: 131 MMHG | HEIGHT: 68 IN

## 2024-06-03 PROCEDURE — G2211 COMPLEX E/M VISIT ADD ON: CPT | Mod: NC,1L

## 2024-06-03 PROCEDURE — 99214 OFFICE O/P EST MOD 30 MIN: CPT

## 2024-06-03 NOTE — HISTORY OF PRESENT ILLNESS
[FreeTextEntry1] : Initial hx 8/2018 Retired in 9/2019 2014 - R sided facial numbness (no pain) after wisdom tooth extraction, involving the entire face even up to forehead and scalp. has been stable, persists to date. 2015 - Went to see neurologist who gave him pills for neuropathy. after got medication, began to have tingling and numbness in fingers, was told he has CTS. then switched medication which made him have mood alteration so he stopped that, then a 3rd medication began to make him dizzy so he stopped that one as well. 2015 - progressively worsening dizziness, SOB, generalized weakness, fatigue. went to [facility], admitted x8d, found anemia. 2016 - referred to pulm, found to have sarcoidosis after lymph node biopsy. sent to another neurologist at [facility] (Dr. Luciano ), had brain MRI that was reportedly normal. Between 2016 and 2018, was getting PFTs and they were stable. was never on rx for sarcoidosis. 6/2018 - developed SOB, went to [place], found to have anemia. 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 Since on hydrocortisone, feels well. MRI of pituitary 8/2018 revealed findings c/w neurosarcoidosis and ?possible meningioma (though I believe this may be a sarcoid nodule) in interhemispheric fissure with surrounding edema. Referred to neurology. 9/2018 - had LP showing atypical lymphocytes, wbc 27. had unremarkable CT abd/pelvis 10/2018. He also had CT chest in 6/2018 showing nodules that were thought related to prior sarcoid dx. Never got planned steroids and was lost to f/u X1y until 10/2019. 1/2020 - got IVMP, then prednisone taper. Lost to f/u, tapered down to 5m prednisone daily. 1/2021 - MRI was worse. Was lost to f/u x1y. I advised to increase prednisone to 20mg and start cellcept 500bid in 1/2021 based on a new MRI in 12/2020 showing worsening, but he never started cellcept. In 1/2022, he reported having been on 10mg daily prednisone for a long time. New MRI 3/2022 showing mixed picture. Although he reported starting cellcept 500 daily in 4/2022 and increasing to 1g bid in 9/2022, in 2/2023 he reports he never started this medication. Never started cellcept due to insurance issues. 11/2023 - Started infliximab, uneventful Since 1/2022, was working for [employer] as . Layed off in early 2024.   Subj interval:  No new neurological symptoms.  Feels well other than chronic intermittent R facial numbness - constant still.   3 kids, all adults. 5 grandkids.   MEDS: synthroid 75mcg prednisone 10mg vitamin B12, V, B6, E, C lipitor infliximab  SHX: no tob, occ etoh, no drugs. [employer] .   O:  AO3. Normally conversant. Follows commands, names, and repeats. Good attention.  pupils reactive, VFF, EOMI, no nystagmus, face symmetric, TUP at midline.  Motor: R: L: Del 5 5 Bi 5 5 Tri 5 5 Wrist Extensors 5 5 Finger abductors 5 5  5 5  HF 5 5 KE 5 5 KF 5 5 DF 5 5 PF 5 5  Tone R L UE 0 0 LE 0 0  Sensory RUE LUE RLE LLE LT + + + + Vib + + + + JPS + + + + PP + + + + Temp + + + +  Reflexes: R L Biceps 2 2 BR 2 2 Triceps 2 2 Pat 2 2 AJ 2 2  TOES WD WD  Coordination: R L FTN 0 0 EVON 0 0 HTS 0 0  Other  Gait: normal, can heel/toe/tandem walk  Assistance: normal   CSF 9/2018 WBC 27 PCR neg crypto neg igg index high culture neg glucose 36 prot 126 fungal neg afb neg lactate neg LDH neg hiso neg VDRL neg [place] igg pos, igm neg ACE nl cytology with atypical lymphocytes   MRI brain 2016 - read as enhancing likely-extra axial mass, interhemispheric, likely meningioma but cannot r/o sarcoid. [id number]. read as some edema.  MRI brain 8/2018 - diffuse leptomeningeal enhancement. interhemispheric lesion is read as ?meningioma vs dural met, though I believe it may also be c/w neurosarcoidosis. The dominant interhemispheric lesion may actually be a left frontal cortical lesion, as the edema is more pronounced in the left hemisphere and it is not clearly dural based. [id number] on my review.  MRI brain 12/2019 - report not yet available. on my review, notable for slight growth of frontal interhemispheric lesion with increase in surrounding edema. diffuse leptomeningeal enhancement is similar on my review.  MRI brain 3/2020 - reviewed, lepto has almost resolved, and large interhemispheric lesion is much improved, edema much improved.  MRI brain 6/2020 - interhemispheric lesion continues to shrink substantially. however, some small new areas of lepto.  MRI brain 12/2020 - somewhat worsened lepto, previously dominant lesion is still smaller than pre-treatment  MRI brain 3/2022 - dominant lesion is a bit worse, other areas have improved but there is new disease as well. overall a mixed picture  MRI brain 1/2023 - read as stable, i think the main lesion is a bit smaller. no new dz.  MRI brain 12/2023 - somewhat improved overall, no new lesions.   AP: Neurosarcoidosis. I believe that the endocrine pathology the pt has had is likely related to the neurosarcoid seen on MRI. Was lost to follow up between 2018 to late 2019 x1y. Clinically stable in 2020, but some worsening radiographically in 12/2020, though midline lesion continued to improve. Then lost to f/u again from 1/2021 to 1/2022. clinically stable in 1/2022. MRI in 3/2022 showed a mixed picture but he had not started cellcept at full dose (or at all). A new brain MRI in 1/2023 was improved. In 2023, he reported that he had never started cellcept but that he has continued on low dose prednisone. Cellcept had a high copay so pt did not start.   Education provided. All questions answered. Management discussed at length. Pt has chronic illness that poses a threat to bodily function.  - cont infliximab q8wks. - new brain MRI now (6m interval) - blood work q3m - pt should not ever decrease prednisone to less than 5mg daily as per endo. from neurological perspective, would lower steroids to the lowest possible dose starting at 10mg/7.5mg for 1-2 weeks as per endo, then 7.5mg/daily if tolerated, with reassessment before going further - blood work now and then q3m - f/u with Dr. Hardin - C 3m

## 2024-06-23 ENCOUNTER — TRANSCRIPTION ENCOUNTER (OUTPATIENT)
Age: 59
End: 2024-06-23

## 2024-06-23 RX ORDER — TESTOSTERONE 50 MG/5G
50 MG/5GM GEL TRANSDERMAL
Qty: 300 | Refills: 0 | Status: ACTIVE | COMMUNITY
Start: 2018-08-30 | End: 1900-01-01

## 2024-06-25 ENCOUNTER — APPOINTMENT (OUTPATIENT)
Dept: MRI IMAGING | Facility: CLINIC | Age: 59
End: 2024-06-25
Payer: COMMERCIAL

## 2024-06-25 ENCOUNTER — OUTPATIENT (OUTPATIENT)
Dept: OUTPATIENT SERVICES | Facility: HOSPITAL | Age: 59
LOS: 1 days | End: 2024-06-25
Payer: COMMERCIAL

## 2024-06-25 DIAGNOSIS — Z00.8 ENCOUNTER FOR OTHER GENERAL EXAMINATION: ICD-10-CM

## 2024-06-25 PROCEDURE — 70553 MRI BRAIN STEM W/O & W/DYE: CPT

## 2024-06-25 PROCEDURE — 70553 MRI BRAIN STEM W/O & W/DYE: CPT | Mod: 26

## 2024-06-25 PROCEDURE — A9585: CPT

## 2024-06-28 LAB
ALBUMIN SERPL ELPH-MCNC: 4.3 G/DL
ALP BLD-CCNC: 74 U/L
ALT SERPL-CCNC: 29 U/L
ANION GAP SERPL CALC-SCNC: 10 MMOL/L
AST SERPL-CCNC: 23 U/L
BASOPHILS # BLD AUTO: 0.07 K/UL
BASOPHILS NFR BLD AUTO: 0.8 %
BILIRUB SERPL-MCNC: 0.5 MG/DL
BUN SERPL-MCNC: 10 MG/DL
CALCIUM SERPL-MCNC: 9.1 MG/DL
CHLORIDE SERPL-SCNC: 103 MMOL/L
CO2 SERPL-SCNC: 27 MMOL/L
CREAT SERPL-MCNC: 1.34 MG/DL
EGFR: 62 ML/MIN/1.73M2
EOSINOPHIL # BLD AUTO: 0.18 K/UL
EOSINOPHIL NFR BLD AUTO: 2.1 %
GLUCOSE SERPL-MCNC: 92 MG/DL
HCT VFR BLD CALC: 44 %
HGB BLD-MCNC: 13.7 G/DL
IMM GRANULOCYTES NFR BLD AUTO: 2.2 %
LYMPHOCYTES # BLD AUTO: 1.37 K/UL
LYMPHOCYTES NFR BLD AUTO: 15.9 %
MAN DIFF?: NORMAL
MCHC RBC-ENTMCNC: 27.1 PG
MCHC RBC-ENTMCNC: 31.1 GM/DL
MCV RBC AUTO: 87.1 FL
MONOCYTES # BLD AUTO: 1.04 K/UL
MONOCYTES NFR BLD AUTO: 12.1 %
NEUTROPHILS # BLD AUTO: 5.78 K/UL
NEUTROPHILS NFR BLD AUTO: 66.9 %
PLATELET # BLD AUTO: 231 K/UL
POTASSIUM SERPL-SCNC: 4.3 MMOL/L
PROT SERPL-MCNC: 6.9 G/DL
RBC # BLD: 5.05 M/UL
RBC # FLD: 13.2 %
SODIUM SERPL-SCNC: 140 MMOL/L
WBC # FLD AUTO: 8.63 K/UL

## 2024-10-03 ENCOUNTER — TRANSCRIPTION ENCOUNTER (OUTPATIENT)
Age: 59
End: 2024-10-03

## 2024-11-04 RX ORDER — PREDNISONE 5 MG/1
5 TABLET ORAL
Qty: 120 | Refills: 1 | Status: ACTIVE | COMMUNITY
Start: 2024-11-04 | End: 1900-01-01

## 2024-11-11 ENCOUNTER — APPOINTMENT (OUTPATIENT)
Dept: NEUROLOGY | Facility: CLINIC | Age: 59
End: 2024-11-11
Payer: COMMERCIAL

## 2024-11-11 ENCOUNTER — NON-APPOINTMENT (OUTPATIENT)
Age: 59
End: 2024-11-11

## 2024-11-11 VITALS
HEIGHT: 67 IN | WEIGHT: 225 LBS | HEART RATE: 66 BPM | BODY MASS INDEX: 35.31 KG/M2 | DIASTOLIC BLOOD PRESSURE: 82 MMHG | SYSTOLIC BLOOD PRESSURE: 116 MMHG

## 2024-11-11 PROCEDURE — G2211 COMPLEX E/M VISIT ADD ON: CPT | Mod: NC

## 2024-11-11 PROCEDURE — 99214 OFFICE O/P EST MOD 30 MIN: CPT

## 2024-11-15 ENCOUNTER — TRANSCRIPTION ENCOUNTER (OUTPATIENT)
Age: 59
End: 2024-11-15

## 2024-12-16 ENCOUNTER — APPOINTMENT (OUTPATIENT)
Dept: NEUROLOGY | Facility: CLINIC | Age: 59
End: 2024-12-16

## 2024-12-18 ENCOUNTER — OUTPATIENT (OUTPATIENT)
Dept: OUTPATIENT SERVICES | Facility: HOSPITAL | Age: 59
LOS: 1 days | End: 2024-12-18
Payer: COMMERCIAL

## 2024-12-18 ENCOUNTER — APPOINTMENT (OUTPATIENT)
Dept: MRI IMAGING | Facility: CLINIC | Age: 59
End: 2024-12-18
Payer: COMMERCIAL

## 2024-12-18 DIAGNOSIS — D86.89 SARCOIDOSIS OF OTHER SITES: ICD-10-CM

## 2024-12-18 PROCEDURE — 70553 MRI BRAIN STEM W/O & W/DYE: CPT | Mod: 26

## 2024-12-18 PROCEDURE — A9585: CPT

## 2024-12-18 PROCEDURE — 70553 MRI BRAIN STEM W/O & W/DYE: CPT

## 2024-12-31 ENCOUNTER — TRANSCRIPTION ENCOUNTER (OUTPATIENT)
Age: 59
End: 2024-12-31

## 2024-12-31 ENCOUNTER — NON-APPOINTMENT (OUTPATIENT)
Age: 59
End: 2024-12-31

## 2025-01-15 ENCOUNTER — RESULT CHARGE (OUTPATIENT)
Age: 60
End: 2025-01-15

## 2025-01-15 ENCOUNTER — APPOINTMENT (OUTPATIENT)
Dept: ENDOCRINOLOGY | Facility: CLINIC | Age: 60
End: 2025-01-15
Payer: COMMERCIAL

## 2025-01-15 VITALS
SYSTOLIC BLOOD PRESSURE: 126 MMHG | WEIGHT: 222 LBS | BODY MASS INDEX: 34.77 KG/M2 | DIASTOLIC BLOOD PRESSURE: 73 MMHG | HEART RATE: 80 BPM

## 2025-01-15 DIAGNOSIS — E55.9 VITAMIN D DEFICIENCY, UNSPECIFIED: ICD-10-CM

## 2025-01-15 DIAGNOSIS — E03.8 OTHER SPECIFIED HYPOTHYROIDISM: ICD-10-CM

## 2025-01-15 DIAGNOSIS — E23.0 HYPOPITUITARISM: ICD-10-CM

## 2025-01-15 DIAGNOSIS — E27.40 UNSPECIFIED ADRENOCORTICAL INSUFFICIENCY: ICD-10-CM

## 2025-01-15 LAB
GLUCOSE BLDC GLUCOMTR-MCNC: 93
HBA1C MFR BLD HPLC: 5.5

## 2025-01-15 PROCEDURE — 99213 OFFICE O/P EST LOW 20 MIN: CPT

## 2025-01-15 PROCEDURE — 36415 COLL VENOUS BLD VENIPUNCTURE: CPT

## 2025-01-16 LAB
25(OH)D3 SERPL-MCNC: 63.8 NG/ML
ALBUMIN SERPL ELPH-MCNC: 4.4 G/DL
ALP BLD-CCNC: 97 U/L
ALT SERPL-CCNC: 31 U/L
ANION GAP SERPL CALC-SCNC: 12 MMOL/L
AST SERPL-CCNC: 30 U/L
BILIRUB SERPL-MCNC: 0.6 MG/DL
BUN SERPL-MCNC: 7 MG/DL
CALCIUM SERPL-MCNC: 9.7 MG/DL
CHLORIDE SERPL-SCNC: 101 MMOL/L
CO2 SERPL-SCNC: 27 MMOL/L
CREAT SERPL-MCNC: 1.36 MG/DL
EGFR: 60 ML/MIN/1.73M2
GLUCOSE SERPL-MCNC: 88 MG/DL
POTASSIUM SERPL-SCNC: 4.3 MMOL/L
PROT SERPL-MCNC: 7.4 G/DL
SODIUM SERPL-SCNC: 141 MMOL/L
T4 FREE SERPL-MCNC: 1.1 NG/DL
TSH SERPL-ACNC: 0.02 UIU/ML

## 2025-01-18 ENCOUNTER — NON-APPOINTMENT (OUTPATIENT)
Age: 60
End: 2025-01-18

## 2025-01-21 ENCOUNTER — TRANSCRIPTION ENCOUNTER (OUTPATIENT)
Age: 60
End: 2025-01-21

## 2025-01-21 LAB
TESTOST FREE SERPL-MCNC: 0.1 PG/ML
TESTOST SERPL-MCNC: 171 NG/DL

## 2025-01-31 ENCOUNTER — TRANSCRIPTION ENCOUNTER (OUTPATIENT)
Age: 60
End: 2025-01-31

## 2025-02-07 ENCOUNTER — TRANSCRIPTION ENCOUNTER (OUTPATIENT)
Age: 60
End: 2025-02-07

## 2025-02-21 ENCOUNTER — APPOINTMENT (OUTPATIENT)
Dept: NEUROLOGY | Facility: CLINIC | Age: 60
End: 2025-02-21
Payer: COMMERCIAL

## 2025-02-21 PROCEDURE — G2211 COMPLEX E/M VISIT ADD ON: CPT | Mod: NC,95

## 2025-02-21 PROCEDURE — 99214 OFFICE O/P EST MOD 30 MIN: CPT | Mod: 95

## 2025-03-03 ENCOUNTER — APPOINTMENT (OUTPATIENT)
Dept: ENDOCRINOLOGY | Facility: CLINIC | Age: 60
End: 2025-03-03

## 2025-04-10 ENCOUNTER — TRANSCRIPTION ENCOUNTER (OUTPATIENT)
Age: 60
End: 2025-04-10

## 2025-04-17 ENCOUNTER — TRANSCRIPTION ENCOUNTER (OUTPATIENT)
Age: 60
End: 2025-04-17

## 2025-04-25 ENCOUNTER — TRANSCRIPTION ENCOUNTER (OUTPATIENT)
Age: 60
End: 2025-04-25

## 2025-05-05 ENCOUNTER — RX RENEWAL (OUTPATIENT)
Age: 60
End: 2025-05-05

## 2025-05-16 ENCOUNTER — TRANSCRIPTION ENCOUNTER (OUTPATIENT)
Age: 60
End: 2025-05-16

## 2025-05-20 ENCOUNTER — TRANSCRIPTION ENCOUNTER (OUTPATIENT)
Age: 60
End: 2025-05-20

## 2025-05-26 ENCOUNTER — RX RENEWAL (OUTPATIENT)
Age: 60
End: 2025-05-26

## 2025-05-27 ENCOUNTER — TRANSCRIPTION ENCOUNTER (OUTPATIENT)
Age: 60
End: 2025-05-27

## 2025-05-28 ENCOUNTER — TRANSCRIPTION ENCOUNTER (OUTPATIENT)
Age: 60
End: 2025-05-28

## 2025-05-29 ENCOUNTER — TRANSCRIPTION ENCOUNTER (OUTPATIENT)
Age: 60
End: 2025-05-29

## 2025-06-03 ENCOUNTER — TRANSCRIPTION ENCOUNTER (OUTPATIENT)
Age: 60
End: 2025-06-03

## 2025-06-20 ENCOUNTER — TRANSCRIPTION ENCOUNTER (OUTPATIENT)
Age: 60
End: 2025-06-20

## 2025-06-20 ENCOUNTER — NON-APPOINTMENT (OUTPATIENT)
Age: 60
End: 2025-06-20

## 2025-06-25 ENCOUNTER — TRANSCRIPTION ENCOUNTER (OUTPATIENT)
Age: 60
End: 2025-06-25

## 2025-07-21 ENCOUNTER — TRANSCRIPTION ENCOUNTER (OUTPATIENT)
Age: 60
End: 2025-07-21

## 2025-07-28 ENCOUNTER — NON-APPOINTMENT (OUTPATIENT)
Age: 60
End: 2025-07-28

## 2025-07-30 ENCOUNTER — TRANSCRIPTION ENCOUNTER (OUTPATIENT)
Age: 60
End: 2025-07-30

## 2025-09-18 ENCOUNTER — TRANSCRIPTION ENCOUNTER (OUTPATIENT)
Age: 60
End: 2025-09-18